# Patient Record
Sex: MALE | Race: WHITE | NOT HISPANIC OR LATINO | URBAN - METROPOLITAN AREA
[De-identification: names, ages, dates, MRNs, and addresses within clinical notes are randomized per-mention and may not be internally consistent; named-entity substitution may affect disease eponyms.]

---

## 2023-03-22 ENCOUNTER — TELEPHONE (OUTPATIENT)
Dept: PRIMARY CARE | Facility: CLINIC | Age: 53
End: 2023-03-22

## 2023-03-22 DIAGNOSIS — Z12.5 SCREENING FOR PROSTATE CANCER: ICD-10-CM

## 2023-03-22 DIAGNOSIS — E11.9 TYPE 2 DIABETES MELLITUS WITHOUT COMPLICATION, WITHOUT LONG-TERM CURRENT USE OF INSULIN (MULTI): Primary | ICD-10-CM

## 2023-03-22 NOTE — TELEPHONE ENCOUNTER
Pt states that he is supposed to have some blood work done coming up soon and I don't see any orders in the system. Can you please advise? Thanks!

## 2023-03-23 DIAGNOSIS — Z12.5 SCREENING FOR PROSTATE CANCER: ICD-10-CM

## 2023-03-23 DIAGNOSIS — E11.9 TYPE 2 DIABETES MELLITUS WITHOUT COMPLICATION, WITHOUT LONG-TERM CURRENT USE OF INSULIN (MULTI): Primary | ICD-10-CM

## 2023-04-06 DIAGNOSIS — B35.1 ONYCHOMYCOSIS OF TOENAIL: Primary | ICD-10-CM

## 2023-04-06 RX ORDER — KETOCONAZOLE 20 MG/G
CREAM TOPICAL 2 TIMES DAILY
COMMUNITY
End: 2023-04-06 | Stop reason: SDUPTHER

## 2023-04-06 RX ORDER — KETOCONAZOLE 20 MG/G
CREAM TOPICAL 2 TIMES DAILY
Qty: 15 G | Refills: 0 | Status: SHIPPED | OUTPATIENT
Start: 2023-04-06 | End: 2023-04-20

## 2023-04-14 ENCOUNTER — DOCUMENTATION (OUTPATIENT)
Dept: PRIMARY CARE | Facility: CLINIC | Age: 53
End: 2023-04-14
Payer: COMMERCIAL

## 2023-04-14 NOTE — TELEPHONE ENCOUNTER
Ke called and said that he is scheduled for a Colonoscopy in a week and a half from now.  He received a message from the provider that is doing the colonoscopy and asked him to verify with you if you need him to make any changes or adjustments to his medications before the procedure.  If we can let him know.  He is ok with us leaving a message on his voicemail if he doesn't answer.  Thank you

## 2023-04-14 NOTE — PROGRESS NOTES
The following phone response note was sent to FABIENNE BLOUNT) to be on 4/14/2023:     Usually it advised to stop Aspirin and Meloxicam 5-7 days before the procedure. THE PATIENT MUST CHECK WITH THE GASTROENTEROLOGIST WHAT THEIR EXACT PROTOCOL IS !!! RESUME ASPRIN and Meloxicam after the procedure per protocol of the performing facility.  THE PATIENT MUST CHECK WITH THE GASTROENTEROLOGIST WHAT THEIR EXACT PROTOCOL IS !!!     Advise the patient to stop Hydrochlorothiazide and blood sugar medications (Jardiance, Glipizide, and Metformin) the day before the procedure; he may resume Hydrochlorothiazide, and blood sugar medications (Jardiance, Glipizide, and Metformin) 3-4 hours after the colonoscopy. Take medications at the usual time of day. TY     Dr. Perez was consulted on the plan of care.

## 2023-04-24 ENCOUNTER — HOSPITAL ENCOUNTER (OUTPATIENT)
Dept: DATA CONVERSION | Facility: HOSPITAL | Age: 53
End: 2023-04-24
Attending: INTERNAL MEDICINE | Admitting: INTERNAL MEDICINE
Payer: COMMERCIAL

## 2023-04-24 DIAGNOSIS — E11.9 TYPE 2 DIABETES MELLITUS WITHOUT COMPLICATIONS (MULTI): ICD-10-CM

## 2023-04-24 DIAGNOSIS — I10 ESSENTIAL (PRIMARY) HYPERTENSION: ICD-10-CM

## 2023-04-24 DIAGNOSIS — E78.5 HYPERLIPIDEMIA, UNSPECIFIED: ICD-10-CM

## 2023-04-24 DIAGNOSIS — Z79.84 LONG TERM (CURRENT) USE OF ORAL HYPOGLYCEMIC DRUGS: ICD-10-CM

## 2023-04-24 DIAGNOSIS — Z79.82 LONG TERM (CURRENT) USE OF ASPIRIN: ICD-10-CM

## 2023-04-24 DIAGNOSIS — Z12.11 ENCOUNTER FOR SCREENING FOR MALIGNANT NEOPLASM OF COLON: ICD-10-CM

## 2023-04-24 LAB — POCT GLUCOSE: 248 MG/DL (ref 74–99)

## 2023-06-12 ENCOUNTER — OFFICE VISIT (OUTPATIENT)
Dept: PRIMARY CARE | Facility: CLINIC | Age: 53
End: 2023-06-12
Payer: COMMERCIAL

## 2023-06-12 VITALS
TEMPERATURE: 98.5 F | WEIGHT: 209.4 LBS | BODY MASS INDEX: 29.98 KG/M2 | HEART RATE: 86 BPM | RESPIRATION RATE: 18 BRPM | OXYGEN SATURATION: 97 % | SYSTOLIC BLOOD PRESSURE: 111 MMHG | DIASTOLIC BLOOD PRESSURE: 70 MMHG | HEIGHT: 70 IN

## 2023-06-12 DIAGNOSIS — E66.9 OBESITY (BMI 30-39.9): ICD-10-CM

## 2023-06-12 DIAGNOSIS — I10 ESSENTIAL (PRIMARY) HYPERTENSION: ICD-10-CM

## 2023-06-12 DIAGNOSIS — E11.9 TYPE 2 DIABETES MELLITUS WITHOUT COMPLICATIONS (MULTI): Primary | ICD-10-CM

## 2023-06-12 PROCEDURE — 3078F DIAST BP <80 MM HG: CPT | Performed by: NURSE PRACTITIONER

## 2023-06-12 PROCEDURE — 1036F TOBACCO NON-USER: CPT | Performed by: NURSE PRACTITIONER

## 2023-06-12 PROCEDURE — 3074F SYST BP LT 130 MM HG: CPT | Performed by: NURSE PRACTITIONER

## 2023-06-12 PROCEDURE — 99213 OFFICE O/P EST LOW 20 MIN: CPT | Performed by: NURSE PRACTITIONER

## 2023-06-12 PROCEDURE — 4010F ACE/ARB THERAPY RXD/TAKEN: CPT | Performed by: NURSE PRACTITIONER

## 2023-06-12 RX ORDER — MELOXICAM 7.5 MG/1
TABLET ORAL
COMMUNITY
Start: 2023-04-18 | End: 2024-05-03 | Stop reason: WASHOUT

## 2023-06-12 RX ORDER — BLOOD SUGAR DIAGNOSTIC
STRIP MISCELLANEOUS
COMMUNITY

## 2023-06-12 RX ORDER — ASPIRIN 81 MG/1
1 TABLET ORAL DAILY
COMMUNITY
Start: 2014-03-10 | End: 2024-05-03

## 2023-06-12 RX ORDER — METFORMIN HYDROCHLORIDE 1000 MG/1
1000 TABLET ORAL 2 TIMES DAILY
COMMUNITY
End: 2023-10-03

## 2023-06-12 RX ORDER — HYDROCHLOROTHIAZIDE 25 MG/1
25 TABLET ORAL DAILY
Qty: 90 TABLET | Refills: 1 | Status: SHIPPED | OUTPATIENT
Start: 2023-06-12 | End: 2024-01-22

## 2023-06-12 RX ORDER — EMPAGLIFLOZIN 10 MG/1
10 TABLET, FILM COATED ORAL
COMMUNITY
End: 2024-01-02

## 2023-06-12 RX ORDER — GLIPIZIDE 10 MG/1
10 TABLET, FILM COATED, EXTENDED RELEASE ORAL 2 TIMES DAILY
Qty: 180 TABLET | Refills: 1 | Status: SHIPPED | OUTPATIENT
Start: 2023-06-12 | End: 2024-02-20

## 2023-06-12 RX ORDER — LISINOPRIL 2.5 MG/1
2.5 TABLET ORAL DAILY
COMMUNITY
End: 2023-08-21

## 2023-06-12 RX ORDER — BLOOD-GLUCOSE CONTROL, NORMAL
EACH MISCELLANEOUS
COMMUNITY
Start: 2023-02-20 | End: 2024-01-03

## 2023-06-12 RX ORDER — OMEGA-3/DHA/EPA/FISH OIL 300-1000MG
1 CAPSULE,DELAYED RELEASE (ENTERIC COATED) ORAL 2 TIMES DAILY
COMMUNITY

## 2023-06-12 RX ORDER — ATORVASTATIN CALCIUM 10 MG/1
10 TABLET, FILM COATED ORAL EVERY OTHER DAY
Qty: 45 TABLET | Refills: 1 | Status: SHIPPED | OUTPATIENT
Start: 2023-06-12 | End: 2023-08-21

## 2023-06-12 RX ORDER — ATORVASTATIN CALCIUM 10 MG/1
10 TABLET, FILM COATED ORAL EVERY OTHER DAY
COMMUNITY
End: 2023-06-12 | Stop reason: SDUPTHER

## 2023-06-12 NOTE — PROGRESS NOTES
"Subjective   Patient ID: Ke Haney is a 52 y.o. male who presents for Follow-up (Blood work results).    HPI   Patient in office for follow-up on hypertension and diabetes and to discuss recent lab work. A1C: 8.2% (patient wants to address with diet adjustment; declines medication changes; risks explained; the patient verbalizes understanding). No symptoms or other concerns today.     BP: normal     Sees ophthalmologist regularly.      Monitors his feet for lesions.     Review of Systems   Constitutional:  Negative for activity change, appetite change, chills, diaphoresis, fatigue, fever and unexpected weight change.   Respiratory:  Negative for apnea, cough, choking, chest tightness, shortness of breath, wheezing and stridor.    Cardiovascular:  Negative for chest pain, palpitations and leg swelling.   Gastrointestinal:  Negative for abdominal pain, diarrhea, nausea and vomiting.   Neurological:  Negative for dizziness, tremors, seizures, syncope, facial asymmetry, speech difficulty, weakness, light-headedness, numbness and headaches.   Hematological:  Negative for adenopathy. Does not bruise/bleed easily.     Objective   /70   Pulse 86   Temp 36.9 °C (98.5 °F)   Resp 18   Ht 1.778 m (5' 10\")   Wt 95 kg (209 lb 6.4 oz)   SpO2 97%   BMI 30.05 kg/m²     Physical Exam  Constitutional:       Appearance: Normal appearance.   HENT:      Head: Normocephalic.   Eyes:      Conjunctiva/sclera: Conjunctivae normal.   Cardiovascular:      Rate and Rhythm: Normal rate and regular rhythm.      Pulses: Normal pulses.      Heart sounds: Normal heart sounds.   Pulmonary:      Effort: Pulmonary effort is normal.      Breath sounds: Normal breath sounds.   Musculoskeletal:      Right lower leg: No edema.      Left lower leg: No edema.   Skin:     General: Skin is warm.      Coloration: Skin is not jaundiced or pale.   Neurological:      General: No focal deficit present.      Mental Status: He is alert. "       Assessment/Plan     Exam findings reviewed with patient. Medications and labs reviewed. Patient will keep monitoring blood pressures and blood sugars at home; he will call the office with outliers or abnormal trends. Follow up in 6 months or earlier (if AM fasting glucose is persistently >150) as needed.    Benefits of healthy lifestyle reviewed: low carbohydrates/fat/sugar diet; use lean white instead of red meet; use several servings of fruit and vegetables daily; use whole grain flour instead of white flour products; cook at home frequently instead of eating out; exercise 30-90 minutes 5 x/week.

## 2023-08-07 DIAGNOSIS — B35.1 ONYCHOMYCOSIS OF TOENAIL: ICD-10-CM

## 2023-08-07 RX ORDER — KETOCONAZOLE 20 MG/G
CREAM TOPICAL
Qty: 30 G | Refills: 0 | Status: SHIPPED | OUTPATIENT
Start: 2023-08-07

## 2023-08-30 ENCOUNTER — TELEPHONE (OUTPATIENT)
Dept: PRIMARY CARE | Facility: CLINIC | Age: 53
End: 2023-08-30
Payer: COMMERCIAL

## 2023-09-07 VITALS
RESPIRATION RATE: 16 BRPM | TEMPERATURE: 96.8 F | HEART RATE: 100 BPM | DIASTOLIC BLOOD PRESSURE: 88 MMHG | SYSTOLIC BLOOD PRESSURE: 149 MMHG

## 2023-09-14 NOTE — H&P
History of Present Illness:   History Present Illness:  Reason for surgery: Screening   HPI:    51 yo man for screening colonoscopy    Allergies:        Allergies:  ·  No Known Allergies :     Home Medication Review:   Home Medications Reviewed: yes     Impression/Procedure:   ·  Impression and Planned Procedure: R/O neoplasm for colonoscopy       ERAS (Enhanced Recovery After Surgery):  ·  ERAS Patient: no       Vital Signs:  Temperature C: 36 degrees C   Temperature F: 96.8 degrees F   Heart Rate: 100 beats per minute   Respiratory Rate: 16 breath per minute   Blood Pressure Systolic: 149 mm/Hg   Blood Pressure Diastolic: 88 mm/Hg     Physical Exam by System:    Constitutional: Well developed, awake/alert/oriented  x3, no distress, alert and cooperative   Respiratory/Thorax: Patent airways, CTAB, normal  breath sounds with good chest expansion, thorax symmetric   Cardiovascular: Regular, rate and rhythm, no murmurs,  2+ equal pulses of the extremities, normal S 1and S 2     Consent:   COVID-19 Consent:  ·  COVID-19 Risk Consent Surgeon has reviewed key risks related to the risk of esme COVID-19 and if they contract COVID-19 what the risks are.       Electronic Signatures:  J Carlos Hernandez)  (Signed 24-Apr-2023 09:02)   Authored: History of Present Illness, Allergies, Home  Medication Review, Impression/Procedure, ERAS, Physical Exam, Consent, Note Completion      Last Updated: 24-Apr-2023 09:02 by J Carlos Hernandez)

## 2023-11-06 ENCOUNTER — TELEPHONE (OUTPATIENT)
Dept: PRIMARY CARE | Facility: CLINIC | Age: 53
End: 2023-11-06
Payer: COMMERCIAL

## 2023-11-06 DIAGNOSIS — Z12.5 SCREENING FOR PROSTATE CANCER: ICD-10-CM

## 2023-11-06 DIAGNOSIS — E11.9 TYPE 2 DIABETES MELLITUS WITHOUT COMPLICATION, WITHOUT LONG-TERM CURRENT USE OF INSULIN (MULTI): Primary | ICD-10-CM

## 2023-11-06 NOTE — TELEPHONE ENCOUNTER
I talked to Ke and he would like to have everything done EXCEPT the Hep B titer.  He said that he would like to do that at a different time.  Can you please place the orders?  He will come to pick them up when they are ready.

## 2023-11-06 NOTE — TELEPHONE ENCOUNTER
Patient notified that the orders are ready to be picked up.  He said that he will come by and get them.

## 2023-11-06 NOTE — TELEPHONE ENCOUNTER
I talked to Ke and asked if he was ok with all of those orders being placed.  He said that he wasn't at home, but he will give us a call back and let us know.

## 2023-11-06 NOTE — TELEPHONE ENCOUNTER
Ke called and asked if you could please place his lab orders.  He goes to [facility].  He will  the orders when they are ready.  Please place the lab orders.  Thank you

## 2023-11-16 ENCOUNTER — APPOINTMENT (OUTPATIENT)
Dept: PRIMARY CARE | Facility: CLINIC | Age: 53
End: 2023-11-16
Payer: COMMERCIAL

## 2023-12-11 ENCOUNTER — OFFICE VISIT (OUTPATIENT)
Dept: PRIMARY CARE | Facility: CLINIC | Age: 53
End: 2023-12-11
Payer: COMMERCIAL

## 2023-12-11 VITALS
DIASTOLIC BLOOD PRESSURE: 74 MMHG | BODY MASS INDEX: 30.05 KG/M2 | HEART RATE: 83 BPM | SYSTOLIC BLOOD PRESSURE: 124 MMHG | OXYGEN SATURATION: 97 % | TEMPERATURE: 98.7 F | WEIGHT: 209.4 LBS

## 2023-12-11 DIAGNOSIS — E11.9 TYPE 2 DIABETES MELLITUS WITHOUT COMPLICATION, WITHOUT LONG-TERM CURRENT USE OF INSULIN (MULTI): Primary | ICD-10-CM

## 2023-12-11 DIAGNOSIS — E66.9 OBESITY (BMI 30-39.9): ICD-10-CM

## 2023-12-11 DIAGNOSIS — I10 ESSENTIAL (PRIMARY) HYPERTENSION: ICD-10-CM

## 2023-12-11 PROCEDURE — 3074F SYST BP LT 130 MM HG: CPT | Performed by: NURSE PRACTITIONER

## 2023-12-11 PROCEDURE — 1036F TOBACCO NON-USER: CPT | Performed by: NURSE PRACTITIONER

## 2023-12-11 PROCEDURE — 4010F ACE/ARB THERAPY RXD/TAKEN: CPT | Performed by: NURSE PRACTITIONER

## 2023-12-11 PROCEDURE — 3078F DIAST BP <80 MM HG: CPT | Performed by: NURSE PRACTITIONER

## 2023-12-11 PROCEDURE — 99213 OFFICE O/P EST LOW 20 MIN: CPT | Performed by: NURSE PRACTITIONER

## 2023-12-11 NOTE — PROGRESS NOTES
Subjective   Patient ID: Ke Haney is a 53 y.o. male who presents for Follow-up (Patient is here for a 6 month follow-up.).    HPI   Patient in office for follow-up on hypertension and diabetes and to discuss recent lab work. A1C: 8.7% (patient wants to address with diet adjustment; declines medication changes; risks explained; the patient verbalizes understanding). No symptoms or other concerns today.     BP: normal     Sees ophthalmologist regularly.      Monitors his feet for lesions.    The patient declines recommended immunizations and referrals to recommended health screens at this time. Risks and benefits of immunizations and health screens reviewed. The patient verbalized understanding.     Review of Systems   Constitutional:  Negative for activity change, appetite change, chills, diaphoresis, fatigue, fever and unexpected weight change.   Respiratory:  Negative for apnea, cough, choking, chest tightness, shortness of breath, wheezing and stridor.    Cardiovascular:  Negative for chest pain, palpitations and leg swelling.   Gastrointestinal:  Negative for abdominal pain, diarrhea, nausea and vomiting.   Neurological:  Negative for dizziness, tremors, seizures, syncope, facial asymmetry, speech difficulty, weakness, light-headedness, numbness and headaches.   Hematological:  Negative for adenopathy. Does not bruise/bleed easily.     Objective   /74   Pulse 83   Temp 37.1 °C (98.7 °F) (Temporal)   Wt 95 kg (209 lb 6.4 oz)   SpO2 97%   BMI 30.05 kg/m²     Physical Exam  Constitutional:       Appearance: Normal appearance.   HENT:      Head: Normocephalic.   Eyes:      Conjunctiva/sclera: Conjunctivae normal.   Cardiovascular:      Rate and Rhythm: Normal rate and regular rhythm.      Pulses: Normal pulses.      Heart sounds: Normal heart sounds.   Pulmonary:      Effort: Pulmonary effort is normal.      Breath sounds: Normal breath sounds.   Musculoskeletal:      Right lower leg: No edema.       Left lower leg: No edema.   Skin:     General: Skin is warm.      Coloration: Skin is not jaundiced or pale.   Neurological:      General: No focal deficit present.      Mental Status: He is alert.       Assessment/Plan     Exam findings reviewed with patient. Medications and labs reviewed. Patient will keep monitoring blood pressures and blood sugars at home; he will call the office with outliers or abnormal trends. Follow up in 3 months or earlier (if AM fasting glucose is persistently >150) as needed.    Benefits of healthy lifestyle reviewed: low carbohydrates/fat/sugar diet; use lean white instead of red meet; use several servings of fruit and vegetables daily; use whole grain flour instead of white flour products; cook at home frequently instead of eating out; exercise 30-90 minutes 5 x/week.

## 2023-12-31 DIAGNOSIS — E11.9 TYPE 2 DIABETES MELLITUS WITHOUT COMPLICATIONS (MULTI): ICD-10-CM

## 2024-01-02 DIAGNOSIS — E11.9 TYPE 2 DIABETES MELLITUS WITHOUT COMPLICATIONS (MULTI): ICD-10-CM

## 2024-01-02 RX ORDER — METFORMIN HYDROCHLORIDE 1000 MG/1
1000 TABLET ORAL 2 TIMES DAILY
Qty: 180 TABLET | Refills: 0 | Status: SHIPPED | OUTPATIENT
Start: 2024-01-02 | End: 2024-04-04

## 2024-01-02 RX ORDER — EMPAGLIFLOZIN 10 MG/1
10 TABLET, FILM COATED ORAL
Qty: 90 TABLET | Refills: 2 | Status: SHIPPED | OUTPATIENT
Start: 2024-01-02 | End: 2024-02-13 | Stop reason: DRUGHIGH

## 2024-01-03 DIAGNOSIS — E11.9 TYPE 2 DIABETES MELLITUS WITHOUT COMPLICATIONS (MULTI): ICD-10-CM

## 2024-01-03 RX ORDER — BLOOD-GLUCOSE CONTROL, NORMAL
EACH MISCELLANEOUS
Qty: 50 EACH | Refills: 2 | Status: SHIPPED | OUTPATIENT
Start: 2024-01-03 | End: 2024-01-03

## 2024-01-03 RX ORDER — BLOOD-GLUCOSE CONTROL, NORMAL
EACH MISCELLANEOUS
Qty: 50 EACH | Refills: 2 | Status: SHIPPED | OUTPATIENT
Start: 2024-01-03

## 2024-02-12 ENCOUNTER — TELEPHONE (OUTPATIENT)
Dept: PRIMARY CARE | Facility: CLINIC | Age: 54
End: 2024-02-12
Payer: COMMERCIAL

## 2024-02-13 DIAGNOSIS — E11.9 TYPE 2 DIABETES MELLITUS WITHOUT COMPLICATION, WITHOUT LONG-TERM CURRENT USE OF INSULIN (MULTI): Primary | ICD-10-CM

## 2024-03-25 ENCOUNTER — OFFICE VISIT (OUTPATIENT)
Dept: PRIMARY CARE | Facility: CLINIC | Age: 54
End: 2024-03-25
Payer: COMMERCIAL

## 2024-03-25 VITALS
HEIGHT: 70 IN | DIASTOLIC BLOOD PRESSURE: 72 MMHG | OXYGEN SATURATION: 97 % | RESPIRATION RATE: 16 BRPM | WEIGHT: 205.6 LBS | HEART RATE: 86 BPM | SYSTOLIC BLOOD PRESSURE: 115 MMHG | TEMPERATURE: 98.3 F | BODY MASS INDEX: 29.43 KG/M2

## 2024-03-25 DIAGNOSIS — E11.9 TYPE 2 DIABETES MELLITUS WITHOUT COMPLICATION, WITHOUT LONG-TERM CURRENT USE OF INSULIN (MULTI): Primary | ICD-10-CM

## 2024-03-25 DIAGNOSIS — E11.9 TYPE 2 DIABETES MELLITUS WITHOUT COMPLICATIONS (MULTI): ICD-10-CM

## 2024-03-25 DIAGNOSIS — I10 ESSENTIAL (PRIMARY) HYPERTENSION: ICD-10-CM

## 2024-03-25 DIAGNOSIS — E66.3 OVERWEIGHT (BMI 25.0-29.9): ICD-10-CM

## 2024-03-25 PROCEDURE — 1036F TOBACCO NON-USER: CPT | Performed by: NURSE PRACTITIONER

## 2024-03-25 PROCEDURE — 4010F ACE/ARB THERAPY RXD/TAKEN: CPT | Performed by: NURSE PRACTITIONER

## 2024-03-25 PROCEDURE — 99213 OFFICE O/P EST LOW 20 MIN: CPT | Performed by: NURSE PRACTITIONER

## 2024-03-25 PROCEDURE — 3074F SYST BP LT 130 MM HG: CPT | Performed by: NURSE PRACTITIONER

## 2024-03-25 PROCEDURE — 3078F DIAST BP <80 MM HG: CPT | Performed by: NURSE PRACTITIONER

## 2024-03-25 RX ORDER — HYDROCHLOROTHIAZIDE 25 MG/1
25 TABLET ORAL DAILY
Qty: 90 TABLET | Refills: 1 | Status: SHIPPED | OUTPATIENT
Start: 2024-03-25

## 2024-03-25 RX ORDER — LISINOPRIL 2.5 MG/1
2.5 TABLET ORAL DAILY
Qty: 90 TABLET | Refills: 1 | Status: SHIPPED | OUTPATIENT
Start: 2024-03-25

## 2024-03-25 RX ORDER — ATORVASTATIN CALCIUM 10 MG/1
10 TABLET, FILM COATED ORAL EVERY OTHER DAY
Qty: 45 TABLET | Refills: 1 | Status: SHIPPED | OUTPATIENT
Start: 2024-03-25

## 2024-03-25 RX ORDER — GLIPIZIDE 10 MG/1
10 TABLET, FILM COATED, EXTENDED RELEASE ORAL 2 TIMES DAILY
Qty: 180 TABLET | Refills: 1 | Status: SHIPPED | OUTPATIENT
Start: 2024-03-25 | End: 2025-03-25

## 2024-03-25 NOTE — PROGRESS NOTES
"Subjective   Patient ID: Ke Haney is a 53 y.o. male who presents for Diabetes.    HPI   Patient in office for follow-up on hypertension and diabetes and to discuss recent lab work. A1C: 8.7%. Patient states that his AM fasting levels have been improving on the increased Jardiance dose are now persistently <150.  No symptoms or other concerns today.     BP: normal     Sees ophthalmologist regularly.      Monitors his feet for lesions.    The patient declines recommended immunizations and referrals to recommended health screens at this time. Risks and benefits of immunizations and health screens reviewed. The patient verbalized understanding.     Review of Systems   Constitutional:  Negative for activity change, appetite change, chills, diaphoresis, fatigue, fever and unexpected weight change.   Respiratory:  Negative for apnea, cough, choking, chest tightness, shortness of breath, wheezing and stridor.    Cardiovascular:  Negative for chest pain, palpitations and leg swelling.   Gastrointestinal:  Negative for abdominal pain, diarrhea, nausea and vomiting.   Neurological:  Negative for dizziness, tremors, seizures, syncope, facial asymmetry, speech difficulty, weakness, light-headedness, numbness and headaches.   Hematological:  Negative for adenopathy. Does not bruise/bleed easily.     Objective   /72   Pulse 86   Temp 36.8 °C (98.3 °F) (Temporal)   Resp 16   Ht 1.778 m (5' 10\")   Wt 93.3 kg (205 lb 9.6 oz)   SpO2 97%   BMI 29.50 kg/m²     Physical Exam  Constitutional:       Appearance: Normal appearance.   HENT:      Head: Normocephalic.   Eyes:      Conjunctiva/sclera: Conjunctivae normal.   Cardiovascular:      Rate and Rhythm: Normal rate and regular rhythm.      Pulses: Normal pulses.      Heart sounds: Normal heart sounds.   Pulmonary:      Effort: Pulmonary effort is normal.      Breath sounds: Normal breath sounds.   Musculoskeletal:      Right lower leg: No edema.      Left lower leg: No " edema.   Skin:     General: Skin is warm.      Coloration: Skin is not jaundiced or pale.   Neurological:      General: No focal deficit present.      Mental Status: He is alert.       Assessment/Plan     Exam findings reviewed with patient. Medications and labs reviewed. Patient will keep monitoring blood pressures and blood sugars at home; he will call the office with outliers or abnormal trends. Follow up in 3 months or earlier (if AM fasting glucose is persistently >150) as needed.    Benefits of healthy lifestyle reviewed: low carbohydrates/fat/sugar diet; use lean white instead of red meet; use several servings of fruit and vegetables daily; use whole grain flour instead of white flour products; cook at home frequently instead of eating out; exercise 30-90 minutes 5 x/week.

## 2024-04-16 ENCOUNTER — TELEPHONE (OUTPATIENT)
Dept: PRIMARY CARE | Facility: CLINIC | Age: 54
End: 2024-04-16
Payer: COMMERCIAL

## 2024-04-16 DIAGNOSIS — E11.9 TYPE 2 DIABETES MELLITUS WITHOUT COMPLICATION, WITHOUT LONG-TERM CURRENT USE OF INSULIN (MULTI): ICD-10-CM

## 2024-04-17 DIAGNOSIS — E11.9 TYPE 2 DIABETES MELLITUS WITHOUT COMPLICATION, WITHOUT LONG-TERM CURRENT USE OF INSULIN (MULTI): ICD-10-CM

## 2024-05-03 ENCOUNTER — OFFICE VISIT (OUTPATIENT)
Dept: PRIMARY CARE | Facility: CLINIC | Age: 54
End: 2024-05-03
Payer: COMMERCIAL

## 2024-05-03 VITALS
RESPIRATION RATE: 16 BRPM | HEIGHT: 70 IN | HEART RATE: 90 BPM | WEIGHT: 192.6 LBS | BODY MASS INDEX: 27.57 KG/M2 | SYSTOLIC BLOOD PRESSURE: 129 MMHG | OXYGEN SATURATION: 97 % | TEMPERATURE: 97.7 F | DIASTOLIC BLOOD PRESSURE: 76 MMHG

## 2024-05-03 DIAGNOSIS — M62.838 MUSCLE SPASM: ICD-10-CM

## 2024-05-03 DIAGNOSIS — M54.31 SCIATICA OF RIGHT SIDE: Primary | ICD-10-CM

## 2024-05-03 DIAGNOSIS — R26.89 ANTALGIC GAIT: ICD-10-CM

## 2024-05-03 PROCEDURE — 4010F ACE/ARB THERAPY RXD/TAKEN: CPT | Performed by: NURSE PRACTITIONER

## 2024-05-03 PROCEDURE — 3074F SYST BP LT 130 MM HG: CPT | Performed by: NURSE PRACTITIONER

## 2024-05-03 PROCEDURE — 1036F TOBACCO NON-USER: CPT | Performed by: NURSE PRACTITIONER

## 2024-05-03 PROCEDURE — 99213 OFFICE O/P EST LOW 20 MIN: CPT | Performed by: NURSE PRACTITIONER

## 2024-05-03 PROCEDURE — 3078F DIAST BP <80 MM HG: CPT | Performed by: NURSE PRACTITIONER

## 2024-05-03 RX ORDER — NAPROXEN 500 MG/1
500 TABLET ORAL 2 TIMES DAILY PRN
Qty: 30 TABLET | Refills: 0 | Status: SHIPPED | OUTPATIENT
Start: 2024-05-03 | End: 2024-05-18

## 2024-05-03 RX ORDER — METHOCARBAMOL 500 MG/1
500 TABLET, FILM COATED ORAL 3 TIMES DAILY PRN
Qty: 30 TABLET | Refills: 0 | Status: SHIPPED | OUTPATIENT
Start: 2024-05-03 | End: 2024-05-13

## 2024-05-03 ASSESSMENT — ENCOUNTER SYMPTOMS
MYALGIAS: 0
UNEXPECTED WEIGHT CHANGE: 0
CHEST TIGHTNESS: 0
WEAKNESS: 0
WHEEZING: 0
NECK PAIN: 0
DIZZINESS: 0
NECK STIFFNESS: 0
COUGH: 0
PALPITATIONS: 0
JOINT SWELLING: 0
DIAPHORESIS: 0
FEVER: 0
HEADACHES: 0
COLOR CHANGE: 0
WOUND: 0
FATIGUE: 0
LIGHT-HEADEDNESS: 0
DIARRHEA: 0
NAUSEA: 0
ARTHRALGIAS: 1
STRIDOR: 0
BACK PAIN: 1
CHOKING: 0
ABDOMINAL PAIN: 0
CHILLS: 0
BRUISES/BLEEDS EASILY: 0
VOMITING: 0
ADENOPATHY: 0
APPETITE CHANGE: 0
ACTIVITY CHANGE: 0
SHORTNESS OF BREATH: 0
APNEA: 0

## 2024-05-03 NOTE — PROGRESS NOTES
"Subjective   Patient ID: Ke Haney is a 53 y.o. male who presents for Sciatica (Started 2 days ago).    HPI   Patient in office for back pain x 2 days. Injured his back changing positions. Pain is mostly on the R side, radiating into R leg. Ambulatory with cane, with stable and steady gait. No loss of bowel or bladder control. No loss of dependent sensation. Hx of sciatica; has responded well to Naproxen and muscle relaxers in the past. No other concerns today.    Review of Systems   Constitutional:  Negative for activity change, appetite change, chills, diaphoresis, fatigue, fever and unexpected weight change.   Respiratory:  Negative for apnea, cough, choking, chest tightness, shortness of breath, wheezing and stridor.    Cardiovascular:  Negative for chest pain, palpitations and leg swelling.   Gastrointestinal:  Negative for abdominal pain, diarrhea, nausea and vomiting.   Musculoskeletal:  Positive for arthralgias, back pain and gait problem. Negative for joint swelling, myalgias, neck pain and neck stiffness.   Skin:  Negative for color change, pallor, rash and wound.   Neurological:  Negative for dizziness, syncope, weakness, light-headedness and headaches.   Hematological:  Negative for adenopathy. Does not bruise/bleed easily.     Objective   /76   Pulse 90   Temp 36.5 °C (97.7 °F) (Temporal)   Resp 16   Ht 1.778 m (5' 10\")   Wt 87.4 kg (192 lb 9.6 oz)   SpO2 97%   BMI 27.64 kg/m²     Physical Exam  Constitutional:       Appearance: Normal appearance.   Cardiovascular:      Rate and Rhythm: Normal rate and regular rhythm.      Pulses: Normal pulses.      Heart sounds: Normal heart sounds.   Pulmonary:      Effort: Pulmonary effort is normal.      Breath sounds: Normal breath sounds.   Musculoskeletal:         General: Tenderness present.      Right lower leg: No edema.      Left lower leg: No edema.      Comments: Tender in Right paraspinal lumbar area; Lumbar spinal ROM decreased   Skin:    "  General: Skin is warm.      Coloration: Skin is not jaundiced or pale.   Neurological:      General: No focal deficit present.      Mental Status: He is alert.      Comments: Slightly antalgic gait on cane; stable and steady        Assessment/Plan     Exam findings reviewed with patient. Medications discussed. Patient declines Toradol injection (hx of vasovagal response). Supportive measures discussed. May use hot-cold therapy. Activity as tolerated. Advised the patient to avoid driving or operating heavy machinery while on muscle relaxer. Consider PT and ortho referrals. Follow up in 1 week or earlier if no improvement.

## 2024-05-03 NOTE — LETTER
5/3/2024    To whom it may concern:    Mr. Ke Haney is currently under my medical care. Please excuse him from work. Tentative return to work is 5/7/2024.    Sincerely,    ADELA Barrios

## 2024-06-13 ENCOUNTER — OFFICE VISIT (OUTPATIENT)
Dept: PRIMARY CARE | Facility: CLINIC | Age: 54
End: 2024-06-13
Payer: COMMERCIAL

## 2024-06-13 VITALS
WEIGHT: 211 LBS | TEMPERATURE: 97.4 F | SYSTOLIC BLOOD PRESSURE: 136 MMHG | RESPIRATION RATE: 16 BRPM | OXYGEN SATURATION: 98 % | HEART RATE: 86 BPM | BODY MASS INDEX: 30.21 KG/M2 | HEIGHT: 70 IN | DIASTOLIC BLOOD PRESSURE: 78 MMHG

## 2024-06-13 DIAGNOSIS — R40.0 DAYTIME SOMNOLENCE: Primary | ICD-10-CM

## 2024-06-13 PROCEDURE — 4010F ACE/ARB THERAPY RXD/TAKEN: CPT | Performed by: NURSE PRACTITIONER

## 2024-06-13 PROCEDURE — 99212 OFFICE O/P EST SF 10 MIN: CPT | Performed by: NURSE PRACTITIONER

## 2024-06-13 PROCEDURE — 3075F SYST BP GE 130 - 139MM HG: CPT | Performed by: NURSE PRACTITIONER

## 2024-06-13 PROCEDURE — 1036F TOBACCO NON-USER: CPT | Performed by: NURSE PRACTITIONER

## 2024-06-13 PROCEDURE — 3078F DIAST BP <80 MM HG: CPT | Performed by: NURSE PRACTITIONER

## 2024-06-13 ASSESSMENT — ENCOUNTER SYMPTOMS
APPETITE CHANGE: 0
FATIGUE: 0
CONFUSION: 0
DECREASED CONCENTRATION: 0
DIAPHORESIS: 0
HYPERACTIVE: 0
TREMORS: 0
AGITATION: 0
WHEEZING: 0
PALPITATIONS: 0
STRIDOR: 0
FEVER: 0
CHILLS: 0
APNEA: 0
CHOKING: 0
NERVOUS/ANXIOUS: 0
UNEXPECTED WEIGHT CHANGE: 0
DIZZINESS: 0
DYSPHORIC MOOD: 0
COUGH: 0
CHEST TIGHTNESS: 0
WEAKNESS: 0
LIGHT-HEADEDNESS: 0
SHORTNESS OF BREATH: 0
HEADACHES: 0
HALLUCINATIONS: 0
SEIZURES: 0
ACTIVITY CHANGE: 0

## 2024-06-13 NOTE — PROGRESS NOTES
"Subjective   Patient ID: Ke Haney is a 54 y.o. male who presents for daytime somnolence.    HPI  Patient in office for a concern of daytime somnolence. The patient states that his work schedule might change in the near future and involve shift work past 4 AM. In the past (over 10 years ago), during a similar shift change, the patient  experienced extreme daytime somnolence and received a medical exemption by Dr. Perez from any work between 4 AM and noon. He has no other concerns today. No sleeping problems currently. The patient wants to be proactive and possibly avoid having to work shifts between 4 AM and noon. No signs of daytime sleepiness currently.     FLAVIA-7: 0/21; PHQ-9: 0/27    Review of Systems   Constitutional:  Negative for activity change, appetite change, chills, diaphoresis, fatigue, fever and unexpected weight change.   Respiratory:  Negative for apnea, cough, choking, chest tightness, shortness of breath, wheezing and stridor.    Cardiovascular:  Negative for chest pain, palpitations and leg swelling.   Neurological:  Negative for dizziness, tremors, seizures, syncope, weakness, light-headedness and headaches.   Psychiatric/Behavioral:  Negative for agitation, behavioral problems, confusion, decreased concentration, dysphoric mood, hallucinations, self-injury and suicidal ideas. The patient is not nervous/anxious and is not hyperactive.      Objective   /78   Pulse 86   Temp 36.3 °C (97.4 °F) (Temporal)   Resp 16   Ht 1.778 m (5' 10\")   Wt 95.7 kg (211 lb)   SpO2 98%   BMI 30.28 kg/m²     Physical Exam  Constitutional:       Appearance: Normal appearance.   Cardiovascular:      Rate and Rhythm: Normal rate.   Pulmonary:      Effort: Pulmonary effort is normal.   Neurological:      General: No focal deficit present.      Mental Status: He is alert and oriented to person, place, and time.      Gait: Gait normal.   Psychiatric:         Mood and Affect: Mood normal.         Behavior: " Behavior normal.         Thought Content: Thought content normal.         Judgment: Judgment normal.       Assessment/Plan     Medical hx reviewed with patient. The patient will consult with his supervisor and with HR and contact our office if he needs a renewal of his medical exemption.

## 2024-06-24 ENCOUNTER — OFFICE VISIT (OUTPATIENT)
Dept: PRIMARY CARE | Facility: CLINIC | Age: 54
End: 2024-06-24
Payer: COMMERCIAL

## 2024-06-24 VITALS
SYSTOLIC BLOOD PRESSURE: 135 MMHG | DIASTOLIC BLOOD PRESSURE: 77 MMHG | OXYGEN SATURATION: 98 % | BODY MASS INDEX: 30.81 KG/M2 | WEIGHT: 215.2 LBS | HEART RATE: 82 BPM | HEIGHT: 70 IN | TEMPERATURE: 97.9 F | RESPIRATION RATE: 16 BRPM

## 2024-06-24 DIAGNOSIS — R40.0 DAYTIME SOMNOLENCE: Primary | ICD-10-CM

## 2024-06-24 PROCEDURE — 3078F DIAST BP <80 MM HG: CPT | Performed by: NURSE PRACTITIONER

## 2024-06-24 PROCEDURE — 3075F SYST BP GE 130 - 139MM HG: CPT | Performed by: NURSE PRACTITIONER

## 2024-06-24 PROCEDURE — 4010F ACE/ARB THERAPY RXD/TAKEN: CPT | Performed by: NURSE PRACTITIONER

## 2024-06-24 PROCEDURE — 1036F TOBACCO NON-USER: CPT | Performed by: NURSE PRACTITIONER

## 2024-06-24 PROCEDURE — 99212 OFFICE O/P EST SF 10 MIN: CPT | Performed by: NURSE PRACTITIONER

## 2024-06-24 ASSESSMENT — ENCOUNTER SYMPTOMS
HEADACHES: 0
FEVER: 0
APPETITE CHANGE: 0
SHORTNESS OF BREATH: 0
CHILLS: 0
CHEST TIGHTNESS: 0
DYSPHORIC MOOD: 0
UNEXPECTED WEIGHT CHANGE: 0
TREMORS: 0
WHEEZING: 0
FATIGUE: 0
HALLUCINATIONS: 0
NERVOUS/ANXIOUS: 0
ACTIVITY CHANGE: 0
WEAKNESS: 0
STRIDOR: 0
DIAPHORESIS: 0
SEIZURES: 0
PALPITATIONS: 0
CHOKING: 0
DIZZINESS: 0
AGITATION: 0
LIGHT-HEADEDNESS: 0
HYPERACTIVE: 0
DECREASED CONCENTRATION: 0
APNEA: 0
COUGH: 0
CONFUSION: 0

## 2024-06-24 NOTE — PROGRESS NOTES
"Subjective   Patient ID: Ke Haney is a 54 y.o. male who presents for daytime somnolence.    HPI  Patient in office for a follow-up/ update on hx of daytime somnolence. The patient states that his work schedule has not changed since his last office visit and that his current schedule has not caused him any sleep-related problems. The patient drives heavy equipment/operates heavy machinery. He was asked by his  advisers at work to follow-up with his PCP for another documented visit, in case his work schedule might change and trigger new episodes of daytime somnolence.  He has no symptoms currently. He has no other concerns today.     Review of Systems   Constitutional:  Negative for activity change, appetite change, chills, diaphoresis, fatigue, fever and unexpected weight change.   Respiratory:  Negative for apnea, cough, choking, chest tightness, shortness of breath, wheezing and stridor.    Cardiovascular:  Negative for chest pain, palpitations and leg swelling.   Neurological:  Negative for dizziness, tremors, seizures, syncope, weakness, light-headedness and headaches.   Psychiatric/Behavioral:  Negative for agitation, behavioral problems, confusion, decreased concentration, dysphoric mood, hallucinations, self-injury and suicidal ideas. The patient is not nervous/anxious and is not hyperactive.      Objective   /77   Pulse 82   Temp 36.6 °C (97.9 °F) (Temporal)   Resp 16   Ht 1.778 m (5' 10\")   Wt 97.6 kg (215 lb 3.2 oz)   SpO2 98%   BMI 30.88 kg/m²     Physical Exam  Constitutional:       Appearance: Normal appearance.   Cardiovascular:      Rate and Rhythm: Normal rate.   Pulmonary:      Effort: Pulmonary effort is normal.   Neurological:      General: No focal deficit present.      Mental Status: He is alert and oriented to person, place, and time.      Gait: Gait normal.   Psychiatric:         Mood and Affect: Mood normal.         Behavior: Behavior normal.         Thought Content: " Thought content normal.         Judgment: Judgment normal.       Assessment/Plan     Medical hx reviewed with patient. The patient will consult with his supervisor and with HR and contact our office if he needs a renewal of his medical exemption.

## 2024-06-26 ENCOUNTER — TELEPHONE (OUTPATIENT)
Dept: PRIMARY CARE | Facility: CLINIC | Age: 54
End: 2024-06-26
Payer: COMMERCIAL

## 2024-06-26 DIAGNOSIS — I10 ESSENTIAL (PRIMARY) HYPERTENSION: ICD-10-CM

## 2024-06-26 RX ORDER — HYDROCHLOROTHIAZIDE 25 MG/1
25 TABLET ORAL DAILY
Qty: 90 TABLET | Refills: 0 | Status: SHIPPED | OUTPATIENT
Start: 2024-06-26

## 2024-06-26 NOTE — TELEPHONE ENCOUNTER
Approving, but needs appt for additional refills.   
Patient requesting refill for hydrochlorothiazide 25 mg., [facility], [address].  
chewing/swallowing difficulty

## 2024-07-16 LAB — HEMOGLOBIN A1C/HEMOGLOBIN TOTAL IN BLOOD EXTERNAL: 9.1 %

## 2024-07-22 ENCOUNTER — APPOINTMENT (OUTPATIENT)
Dept: PRIMARY CARE | Facility: CLINIC | Age: 54
End: 2024-07-22
Payer: COMMERCIAL

## 2024-07-22 VITALS
OXYGEN SATURATION: 95 % | WEIGHT: 206.8 LBS | RESPIRATION RATE: 16 BRPM | DIASTOLIC BLOOD PRESSURE: 83 MMHG | BODY MASS INDEX: 29.67 KG/M2 | TEMPERATURE: 97.5 F | HEART RATE: 79 BPM | SYSTOLIC BLOOD PRESSURE: 131 MMHG

## 2024-07-22 DIAGNOSIS — I10 ESSENTIAL (PRIMARY) HYPERTENSION: ICD-10-CM

## 2024-07-22 DIAGNOSIS — E87.6 HYPOKALEMIA: ICD-10-CM

## 2024-07-22 DIAGNOSIS — E11.9 TYPE 2 DIABETES MELLITUS WITHOUT COMPLICATION, WITHOUT LONG-TERM CURRENT USE OF INSULIN (MULTI): Primary | ICD-10-CM

## 2024-07-22 DIAGNOSIS — R40.0 DAYTIME SOMNOLENCE: ICD-10-CM

## 2024-07-22 PROCEDURE — 3075F SYST BP GE 130 - 139MM HG: CPT | Performed by: NURSE PRACTITIONER

## 2024-07-22 PROCEDURE — 4010F ACE/ARB THERAPY RXD/TAKEN: CPT | Performed by: NURSE PRACTITIONER

## 2024-07-22 PROCEDURE — 3079F DIAST BP 80-89 MM HG: CPT | Performed by: NURSE PRACTITIONER

## 2024-07-22 PROCEDURE — 3046F HEMOGLOBIN A1C LEVEL >9.0%: CPT | Performed by: NURSE PRACTITIONER

## 2024-07-22 PROCEDURE — 1036F TOBACCO NON-USER: CPT | Performed by: NURSE PRACTITIONER

## 2024-07-22 PROCEDURE — 99213 OFFICE O/P EST LOW 20 MIN: CPT | Performed by: NURSE PRACTITIONER

## 2024-07-22 NOTE — PROGRESS NOTES
Subjective   Patient ID: Ke Haney is a 54 y.o. male who presents for Diabetes and Follow-up (Lab results).    Diabetes       Patient in office for follow-up on hypertension and diabetes and to discuss recent lab work. A1C: 9.1%. Patient states that his AM fasting levels have been 170s-220s. The patient is willing to start a weekly injectable; he will check with his insurance which medications are covered and call our office with an update. Potassium is slightly low; recheck in 1 week. Work schedule has not changed; no symptoms of daytime somnolence.     No symptoms or other concerns today.     BP: normal     Sees ophthalmologist regularly.      Monitors his feet for lesions.    The patient declines recommended immunizations and referrals to recommended health screens at this time. Risks and benefits of immunizations and health screens reviewed. The patient verbalized understanding.     Review of Systems   Constitutional:  Negative for activity change, appetite change, chills, diaphoresis, fatigue, fever and unexpected weight change.   Respiratory:  Negative for apnea, cough, choking, chest tightness, shortness of breath, wheezing and stridor.    Cardiovascular:  Negative for chest pain, palpitations and leg swelling.   Gastrointestinal:  Negative for abdominal pain, diarrhea, nausea and vomiting.   Neurological:  Negative for dizziness, tremors, seizures, syncope, facial asymmetry, speech difficulty, weakness, light-headedness, numbness and headaches.   Hematological:  Negative for adenopathy. Does not bruise/bleed easily.     Objective   /83   Pulse 79   Temp 36.4 °C (97.5 °F) (Temporal)   Resp 16   Wt 93.8 kg (206 lb 12.8 oz)   SpO2 95%   BMI 29.67 kg/m²     Physical Exam  Constitutional:       Appearance: Normal appearance.   HENT:      Head: Normocephalic.   Eyes:      Conjunctiva/sclera: Conjunctivae normal.   Cardiovascular:      Rate and Rhythm: Normal rate.   Pulmonary:      Effort: Pulmonary  effort is normal.   Skin:     General: Skin is warm.      Coloration: Skin is not jaundiced or pale.   Neurological:      General: No focal deficit present.      Mental Status: He is alert.       Assessment/Plan     Exam findings reviewed with patient. Medications and labs reviewed. Patient will keep monitoring blood pressures and blood sugars at home; he will call the office with outliers or abnormal trends. We will call with lab results and update the plan of care Follow up in 3 months as needed.    Benefits of healthy lifestyle reviewed: low carbohydrates/fat/sugar diet; use lean white instead of red meet; use several servings of fruit and vegetables daily; use whole grain flour instead of white flour products; cook at home frequently instead of eating out; exercise 30-90 minutes 5 x/week.

## 2024-10-12 DIAGNOSIS — E11.9 TYPE 2 DIABETES MELLITUS WITHOUT COMPLICATIONS (MULTI): ICD-10-CM

## 2024-10-14 RX ORDER — BLOOD-GLUCOSE CONTROL, NORMAL
EACH MISCELLANEOUS
Qty: 50 EACH | Refills: 2 | Status: SHIPPED | OUTPATIENT
Start: 2024-10-14

## 2024-10-30 DIAGNOSIS — E11.9 TYPE 2 DIABETES MELLITUS WITHOUT COMPLICATION, WITHOUT LONG-TERM CURRENT USE OF INSULIN (MULTI): ICD-10-CM

## 2024-11-11 ENCOUNTER — APPOINTMENT (OUTPATIENT)
Dept: PRIMARY CARE | Facility: CLINIC | Age: 54
End: 2024-11-11
Payer: COMMERCIAL

## 2024-11-11 VITALS
HEIGHT: 70 IN | WEIGHT: 202 LBS | TEMPERATURE: 98.1 F | HEART RATE: 75 BPM | BODY MASS INDEX: 28.92 KG/M2 | OXYGEN SATURATION: 97 % | SYSTOLIC BLOOD PRESSURE: 117 MMHG | DIASTOLIC BLOOD PRESSURE: 77 MMHG

## 2024-11-11 DIAGNOSIS — E87.6 HYPOKALEMIA: ICD-10-CM

## 2024-11-11 DIAGNOSIS — E11.9 TYPE 2 DIABETES MELLITUS WITHOUT COMPLICATION, WITHOUT LONG-TERM CURRENT USE OF INSULIN (MULTI): Primary | ICD-10-CM

## 2024-11-11 DIAGNOSIS — R73.09 HEMOGLOBIN A1C GREATER THAN 9%, INDICATING POOR DIABETIC CONTROL: ICD-10-CM

## 2024-11-11 DIAGNOSIS — E66.3 OVERWEIGHT (BMI 25.0-29.9): ICD-10-CM

## 2024-11-11 PROCEDURE — 4010F ACE/ARB THERAPY RXD/TAKEN: CPT | Performed by: NURSE PRACTITIONER

## 2024-11-11 PROCEDURE — 3046F HEMOGLOBIN A1C LEVEL >9.0%: CPT | Performed by: NURSE PRACTITIONER

## 2024-11-11 PROCEDURE — 99214 OFFICE O/P EST MOD 30 MIN: CPT | Performed by: NURSE PRACTITIONER

## 2024-11-11 PROCEDURE — 3008F BODY MASS INDEX DOCD: CPT | Performed by: NURSE PRACTITIONER

## 2024-11-11 PROCEDURE — 3074F SYST BP LT 130 MM HG: CPT | Performed by: NURSE PRACTITIONER

## 2024-11-11 PROCEDURE — 3078F DIAST BP <80 MM HG: CPT | Performed by: NURSE PRACTITIONER

## 2024-11-11 ASSESSMENT — COLUMBIA-SUICIDE SEVERITY RATING SCALE - C-SSRS
6. HAVE YOU EVER DONE ANYTHING, STARTED TO DO ANYTHING, OR PREPARED TO DO ANYTHING TO END YOUR LIFE?: NO
1. IN THE PAST MONTH, HAVE YOU WISHED YOU WERE DEAD OR WISHED YOU COULD GO TO SLEEP AND NOT WAKE UP?: NO
2. HAVE YOU ACTUALLY HAD ANY THOUGHTS OF KILLING YOURSELF?: NO

## 2024-11-11 ASSESSMENT — PATIENT HEALTH QUESTIONNAIRE - PHQ9
1. LITTLE INTEREST OR PLEASURE IN DOING THINGS: NOT AT ALL
SUM OF ALL RESPONSES TO PHQ9 QUESTIONS 1 AND 2: 0
2. FEELING DOWN, DEPRESSED OR HOPELESS: NOT AT ALL

## 2024-11-11 NOTE — PROGRESS NOTES
"Subjective   Patient ID: Ke Haney is a 54 y.o. male who presents for Follow-up (Pt is here for F/U ).    HPI  Patient in office for follow-up on hypertension and diabetes and to discuss recent lab work. A1C: 9.2%; remains above goal. AM fasting levels at home have been 170s-220s. The patient agrees to a referral to clinical pharmacology for an evaluation and medication adjustment; will refer to Dr. Vasquez and notify her about the referral. Hypokalemia has resolved. No symptoms or other concerns today.     BP: normal     Sees ophthalmologist regularly.      Monitors his feet for lesions.    The patient declines recommended immunizations and referrals to recommended health screens at this time. Risks and benefits of immunizations and health screens reviewed. The patient verbalized understanding.     Review of Systems   Constitutional:  Negative for activity change, appetite change, chills, diaphoresis, fatigue, fever and unexpected weight change.   Respiratory:  Negative for apnea, cough, choking, chest tightness, shortness of breath, wheezing and stridor.    Cardiovascular:  Negative for chest pain, palpitations and leg swelling.   Gastrointestinal:  Negative for abdominal pain, diarrhea, nausea and vomiting.   Neurological:  Negative for dizziness, tremors, seizures, syncope, facial asymmetry, speech difficulty, weakness, light-headedness, numbness and headaches.   Hematological:  Negative for adenopathy. Does not bruise/bleed easily.     Objective   /77   Pulse 75   Temp 36.7 °C (98.1 °F)   Ht 1.778 m (5' 10\")   Wt 91.6 kg (202 lb)   SpO2 97%   BMI 28.98 kg/m²     Physical Exam  Constitutional:       Appearance: Normal appearance.   HENT:      Head: Normocephalic.   Eyes:      Conjunctiva/sclera: Conjunctivae normal.   Cardiovascular:      Rate and Rhythm: Normal rate.   Pulmonary:      Effort: Pulmonary effort is normal.   Skin:     General: Skin is warm.      Coloration: Skin is not jaundiced or " pale.   Neurological:      General: No focal deficit present.      Mental Status: He is alert.       Assessment/Plan     Exam findings reviewed with patient. Medications and labs reviewed. Patient will keep monitoring blood pressures and blood sugars at home; he will call the office with outliers or abnormal trends. We will call with lab results and update the plan of care Follow up in 6 months or earlier as needed.    Benefits of healthy lifestyle reviewed: low carbohydrates/fat/sugar diet; use lean white instead of red meet; use several servings of fruit and vegetables daily; use whole grain flour instead of white flour products; cook at home frequently instead of eating out; exercise 30-90 minutes 5 x/week.

## 2024-11-15 ENCOUNTER — TELEMEDICINE (OUTPATIENT)
Dept: PHARMACY | Facility: HOSPITAL | Age: 54
End: 2024-11-15
Payer: COMMERCIAL

## 2024-11-15 DIAGNOSIS — R73.09 HEMOGLOBIN A1C GREATER THAN 9%, INDICATING POOR DIABETIC CONTROL: ICD-10-CM

## 2024-11-15 NOTE — PROGRESS NOTES
Kostas T Childs, APRN-CNP,  You are receiving this task as part of the Diabetes System of Excellence Care Path developed by the [facility] to help identify and close care gaps for the patients with diabetes in your practice. This patient has been identified as having a Hba1c >8.5%. Please use the below information to schedule the patient for a diabetes follow up appointment, (or an Annual Wellness exam if they are overdue), adjust medication(s), order updated labs and/or refer the patient for a consultation with Endocrinology, Dietician, Diabetic Educator or DSME, [facility] or Pharmacy. Included for your reference is a link to the Diabetes Clinical Practice Guidelines, developed by Dr. Sona Malcolm and the  [facility]abetes Center Team.    Visit date not found  Future Appointments   Date Time Provider Department Center   5/12/2025 11:00 AM [facility] [place]     Subjective   HPI    Objective     Current BMI:   BMI Readings from Last 1 Encounters:   11/11/24 28.98 kg/m²     Wt Readings from Last 3 Encounters:   11/11/24 91.6 kg (202 lb)   07/22/24 93.8 kg (206 lb 12.8 oz)   06/24/24 97.6 kg (215 lb 3.2 oz)     There were no vitals taken for this visit.  BP Readings from Last 3 Encounters:   11/11/24 117/77   07/22/24 131/83   06/24/24 135/77     Lab Results   Component Value Date    BILITOT 0.4 04/16/2018    CALCIUM 9.1 04/16/2018    CO2 28 04/16/2018     04/16/2018    CREATININE 0.78 04/16/2018    GLUCOSE 143 (H) 04/16/2018    ALKPHOS 72 04/16/2018    K 4.4 04/16/2018    PROT 6.3 (L) 04/16/2018     04/16/2018    AST 13 04/16/2018    ALT 19 04/16/2018    BUN 21 04/16/2018    ANIONGAP 11 04/16/2018    ALBUMIN 4.4 04/16/2018     Lab Results   Component Value Date    TRIG 146 04/16/2018    CHOL 183 04/16/2018    HDL 44.0 04/16/2018     Lab Results   Component Value Date    HGBA1C 9.1 07/15/2024     The ASCVD Risk score (Katie DK, et al., 2019) failed to calculate for the following reasons:     Cannot find a previous HDL lab    Cannot find a previous total cholesterol lab    Current Outpatient Medications   Medication Instructions    Accu-Chek SmartView Test Strip strip test twice a day    atorvastatin (LIPITOR) 10 mg, oral, Every other day    glipiZIDE XL (GLUCOTROL XL) 10 mg, oral, 2 times daily, Do not crush, chew, or split.    hydroCHLOROthiazide (HYDRODIURIL) 25 mg, oral, Daily    Jardiance 25 mg, oral, Daily    ketoconazole (NIZOral) 2 % cream APPLY SPARINGLY TO AFFECTED AREA(S) TWICE DAILY    lancets (OneTouch Delica Plus Lancet) 30 gauge misc CHECK BLOOD SUGARS DAILY AND AS NEEDED    lisinopril 2.5 mg, oral, Daily    metFORMIN (GLUCOPHAGE) 1,000 mg, oral, 2 times daily    omega 3-dha-epa-fish oil (Fish OiL) 300-1,000 mg capsule,delayed release(DR/EC) 1 capsule, 2 times daily      Health Maintenance   Topic Date Due    Yearly Adult Physical  Never done    HIV Screening  Never done    MMR Vaccines (1 of 1 - Standard series) Never done    Pneumococcal Vaccine: Pediatrics (0 to 5 Years) and At-Risk Patients (6 to 64 Years) (1 of 2 - PCV) Never done    Diabetes: Retinopathy Screening  Never done    Hepatitis C Screening  Never done    Hepatitis B Vaccines (1 of 3 - 19+ 3-dose series) Never done    DTaP/Tdap/Td Vaccines (1 - Tdap) Never done    Zoster Vaccines (1 of 2) Never done    Diabetes: Urine Protein Screening  04/16/2019    Influenza Vaccine (1) Never done    COVID-19 Vaccine (1 - 2024-25 season) Never done    Lipid Panel  12/05/2024    Diabetes: Hemoglobin A1C  02/04/2025    Colorectal Cancer Screening  04/24/2033    HIB Vaccines  Aged Out    IPV Vaccines  Aged Out    Hepatitis A Vaccines  Aged Out    Meningococcal Vaccine  Aged Out    Rotavirus Vaccines  Aged Out    HPV Vaccines  Aged Out    Irritable Bowel Syndrome  Discontinued     Allergies:  Patient has no known allergies.    Current monitoring regimen:   Patient is using: glucometer    SMBG Readings: none to report    Patient Goals  -  Fasting B - 130 mg/dL  - Postprandial BG: less than 180 mg/dL  - A1c: less than 7%    PHARMACY ASSESSMENT:  Current DM Pharmacotherapy:   -metformin 1,000 mg bid  -Jardiance 25 mg daily  -glipizide XL 10 mg bid    Pertinent PMH Review:  - PMH of Pancreatitis: No  - PMH/FH of Medullary Thyroid Cancer: No  - PMH of Retinopathy: No  - PMH of Urinary Tract Infections: No    Secondary Prevention:  - Statin? Yes  - ACE-I/ARB? Yes  - Aspirin? No    Assessment/Plan   Discussed starting a new medication to help bring BG into control. Discussed an injectable GLP-1, but patient wants to try oral formulations first before doing an injectable medication. Rybelsus is covered under patient's plan for $5 per month. Will start patient on Rybelsus 3 mg daily and follow up in 3 weeks for dose titration.    Counseled patient on Rybelsus MOA, expectations, side effects, duration of therapy, administration, and monitoring parameters.   -Discussed administering greater than 30 minutes before first food, beverage, or other medications in the morning.   -Reviewed Rybelsus titration schedule, starting with 3mg once daily for 30 days, then increase to 7 mg once daily; may increase to 14 mg once daily after 30 days on the 7 mg dose if needed to achieve glycemic goals.  -If there is a missed dose, then this dose should be skipped; resume at the next scheduled dose.  -Counseled patient on the benefits of GLP-1ra, such as cardiovascular risk reduction, glycemic control, and weight loss potential.  -Advised patient that they may experience improved satiety after meals and portion sizes of meals may be reduced as doses of Rybelsus increase.  -Counseled patient to avoid foods that are fatty/oily as this may precipitate the nausea/GI upset that may occur with new start Rybelsus    ·For note: when starting Rybelsus: The lower initial dose (3 mg daily) is intended to reduce GI symptoms; it does not provide effective glycemic  control    PLAN:  START Rybelsus 3 mg daily  Follow up in 3 weeks    Kim Vasquez, Dae    Continue all meds under the continuation of care with the referring provider and clinical pharmacy team.    Link to the Diabetes Clinical Practice Guidelines developed by the  Diabetes Team:  [email or web address]/[email or web address].aspx

## 2024-12-06 ENCOUNTER — APPOINTMENT (OUTPATIENT)
Dept: PHARMACY | Facility: HOSPITAL | Age: 54
End: 2024-12-06
Payer: COMMERCIAL

## 2024-12-06 DIAGNOSIS — R73.09 HEMOGLOBIN A1C GREATER THAN 9%, INDICATING POOR DIABETIC CONTROL: ICD-10-CM

## 2024-12-06 NOTE — PROGRESS NOTES
Kostas T Childs, APRN-CNP,  You are receiving this task as part of the Diabetes System of Excellence Care Path developed by the [facility] to help identify and close care gaps for the patients with diabetes in your practice. This patient has been identified as having a Hba1c >8.5%. Please use the below information to schedule the patient for a diabetes follow up appointment, (or an Annual Wellness exam if they are overdue), adjust medication(s), order updated labs and/or refer the patient for a consultation with Endocrinology, Dietician, Diabetic Educator or DSME, [facility] or Pharmacy. Included for your reference is a link to the Diabetes Clinical Practice Guidelines, developed by Dr. Sona Malcolm and the  Diabetes Center Team.    Visit date not found  Future Appointments   Date Time Provider Department Center   5/12/2025 11:00 AM [facility] [place]     Subjective   HPI    Objective     Current BMI:   BMI Readings from Last 1 Encounters:   11/11/24 28.98 kg/m²     Wt Readings from Last 3 Encounters:   11/11/24 91.6 kg (202 lb)   07/22/24 93.8 kg (206 lb 12.8 oz)   06/24/24 97.6 kg (215 lb 3.2 oz)     There were no vitals taken for this visit.  BP Readings from Last 3 Encounters:   11/11/24 117/77   07/22/24 131/83   06/24/24 135/77     Lab Results   Component Value Date    BILITOT 0.4 04/16/2018    CALCIUM 9.1 04/16/2018    CO2 28 04/16/2018     04/16/2018    CREATININE 0.78 04/16/2018    GLUCOSE 143 (H) 04/16/2018    ALKPHOS 72 04/16/2018    K 4.4 04/16/2018    PROT 6.3 (L) 04/16/2018     04/16/2018    AST 13 04/16/2018    ALT 19 04/16/2018    BUN 21 04/16/2018    ANIONGAP 11 04/16/2018    ALBUMIN 4.4 04/16/2018     Lab Results   Component Value Date    TRIG 146 04/16/2018    CHOL 183 04/16/2018    HDL 44.0 04/16/2018     Lab Results   Component Value Date    HGBA1C 9.1 07/15/2024     The ASCVD Risk score (Katie DK, et al., 2019) failed to calculate for the following reasons:     Cannot find a previous HDL lab    Cannot find a previous total cholesterol lab    Current Outpatient Medications   Medication Instructions    Accu-Chek SmartView Test Strip strip test twice a day    atorvastatin (LIPITOR) 10 mg, oral, Every other day    glipiZIDE XL (GLUCOTROL XL) 10 mg, oral, 2 times daily, Do not crush, chew, or split.    hydroCHLOROthiazide (HYDRODIURIL) 25 mg, oral, Daily    Jardiance 25 mg, oral, Daily    ketoconazole (NIZOral) 2 % cream APPLY SPARINGLY TO AFFECTED AREA(S) TWICE DAILY    lancets (OneTouch Delica Plus Lancet) 30 gauge misc CHECK BLOOD SUGARS DAILY AND AS NEEDED    lisinopril 2.5 mg, oral, Daily    metFORMIN (GLUCOPHAGE) 1,000 mg, oral, 2 times daily    omega 3-dha-epa-fish oil (Fish OiL) 300-1,000 mg capsule,delayed release(DR/EC) 1 capsule, 2 times daily    semaglutide (RYBELSUS) 3 mg, oral, Daily      Health Maintenance   Topic Date Due    Yearly Adult Physical  Never done    HIV Screening  Never done    MMR Vaccines (1 of 1 - Standard series) Never done    Pneumococcal Vaccine: Pediatrics (0 to 5 Years) and At-Risk Patients (6 to 64 Years) (1 of 2 - PCV) Never done    Diabetes: Retinopathy Screening  Never done    Hepatitis C Screening  Never done    Hepatitis B Vaccines (1 of 3 - 19+ 3-dose series) Never done    DTaP/Tdap/Td Vaccines (1 - Tdap) Never done    Zoster Vaccines (1 of 2) Never done    Diabetes: Urine Protein Screening  04/16/2019    Influenza Vaccine (1) Never done    COVID-19 Vaccine (1 - 2024-25 season) Never done    Lipid Panel  12/05/2024    Diabetes: Hemoglobin A1C  02/04/2025    Colorectal Cancer Screening  04/24/2033    HIB Vaccines  Aged Out    IPV Vaccines  Aged Out    Hepatitis A Vaccines  Aged Out    Meningococcal Vaccine  Aged Out    Rotavirus Vaccines  Aged Out    HPV Vaccines  Aged Out    Irritable Bowel Syndrome  Discontinued     Allergies:  Patient has no known allergies.    Current monitoring regimen:   Patient is using: glucometer    SMBG  Readings: none to report    Patient Goals  - Fasting B - 130 mg/dL  - Postprandial BG: less than 180 mg/dL  - A1c: less than 7%    PHARMACY ASSESSMENT:  Current DM Pharmacotherapy:   -metformin 1,000 mg bid  -Jardiance 25 mg daily  -glipizide XL 10 mg bid    Pertinent PMH Review:  - PMH of Pancreatitis: No  - PMH/FH of Medullary Thyroid Cancer: No  - PMH of Retinopathy: No  - PMH of Urinary Tract Infections: No    Secondary Prevention:  - Statin? Yes  - ACE-I/ARB? Yes  - Aspirin? No    Assessment/Plan   Patient reports no issues with starting Rybelsus. He has only been taking medication for a week. Will follow up in 2 weeks to do dose titration.    PLAN:  CONTINUE Rybelsus 3 mg daily  Follow up in 2 weeks    Kim Vasquez, PharmD    Continue all meds under the continuation of care with the referring provider and clinical pharmacy team.    Link to the Diabetes Clinical Practice Guidelines developed by the  Diabetes Team:  [email or web address]/[email or web address].aspx

## 2024-12-20 ENCOUNTER — APPOINTMENT (OUTPATIENT)
Dept: PHARMACY | Facility: HOSPITAL | Age: 54
End: 2024-12-20
Payer: COMMERCIAL

## 2024-12-20 DIAGNOSIS — R73.09 HEMOGLOBIN A1C GREATER THAN 9%, INDICATING POOR DIABETIC CONTROL: ICD-10-CM

## 2024-12-20 NOTE — PROGRESS NOTES
Kostas T Childs, APRN-CNP,  You are receiving this task as part of the Diabetes System of Excellence Care Path developed by the [facility] to help identify and close care gaps for the patients with diabetes in your practice. This patient has been identified as having a Hba1c >8.5%. Please use the below information to schedule the patient for a diabetes follow up appointment, (or an Annual Wellness exam if they are overdue), adjust medication(s), order updated labs and/or refer the patient for a consultation with Endocrinology, Dietician, Diabetic Educator or DSME, [facility] or Pharmacy. Included for your reference is a link to the Diabetes Clinical Practice Guidelines, developed by Dr. Sona Malcolm and the  Diabetes Center Team.    Visit date not found  Future Appointments   Date Time Provider Department Center   5/12/2025 11:00 AM [facility] [place]     Subjective   HPI    Objective     Current BMI:   BMI Readings from Last 1 Encounters:   11/11/24 28.98 kg/m²     Wt Readings from Last 3 Encounters:   11/11/24 91.6 kg (202 lb)   07/22/24 93.8 kg (206 lb 12.8 oz)   06/24/24 97.6 kg (215 lb 3.2 oz)     There were no vitals taken for this visit.  BP Readings from Last 3 Encounters:   11/11/24 117/77   07/22/24 131/83   06/24/24 135/77     Lab Results   Component Value Date    BILITOT 0.4 04/16/2018    CALCIUM 9.1 04/16/2018    CO2 28 04/16/2018     04/16/2018    CREATININE 0.78 04/16/2018    GLUCOSE 143 (H) 04/16/2018    ALKPHOS 72 04/16/2018    K 4.4 04/16/2018    PROT 6.3 (L) 04/16/2018     04/16/2018    AST 13 04/16/2018    ALT 19 04/16/2018    BUN 21 04/16/2018    ANIONGAP 11 04/16/2018    ALBUMIN 4.4 04/16/2018     Lab Results   Component Value Date    TRIG 146 04/16/2018    CHOL 183 04/16/2018    HDL 44.0 04/16/2018     Lab Results   Component Value Date    HGBA1C 9.1 07/15/2024     The ASCVD Risk score (Katie DK, et al., 2019) failed to calculate for the following reasons:     Cannot find a previous HDL lab    Cannot find a previous total cholesterol lab    Current Outpatient Medications   Medication Instructions    Accu-Chek SmartView Test Strip strip test twice a day    atorvastatin (LIPITOR) 10 mg, oral, Every other day    glipiZIDE XL (GLUCOTROL XL) 10 mg, oral, 2 times daily, Do not crush, chew, or split.    hydroCHLOROthiazide (HYDRODIURIL) 25 mg, oral, Daily    Jardiance 25 mg, oral, Daily    ketoconazole (NIZOral) 2 % cream APPLY SPARINGLY TO AFFECTED AREA(S) TWICE DAILY    lancets (OneTouch Delica Plus Lancet) 30 gauge misc CHECK BLOOD SUGARS DAILY AND AS NEEDED    lisinopril 2.5 mg, oral, Daily    metFORMIN (GLUCOPHAGE) 1,000 mg, oral, 2 times daily    omega 3-dha-epa-fish oil (Fish OiL) 300-1,000 mg capsule,delayed release(DR/EC) 1 capsule, 2 times daily    semaglutide (RYBELSUS) 3 mg, oral, Daily      Health Maintenance   Topic Date Due    Yearly Adult Physical  Never done    HIV Screening  Never done    MMR Vaccines (1 of 1 - Standard series) Never done    Diabetes: Retinopathy Screening  Never done    Hepatitis C Screening  Never done    Hepatitis B Vaccines (1 of 3 - 19+ 3-dose series) Never done    Pneumococcal Vaccine (1 of 2 - PCV) Never done    DTaP/Tdap/Td Vaccines (1 - Tdap) Never done    Zoster Vaccines (1 of 2) Never done    Diabetes: Urine Protein Screening  04/16/2019    Influenza Vaccine (1) Never done    COVID-19 Vaccine (1 - 2024-25 season) Never done    Lipid Panel  12/05/2024    Diabetes: Hemoglobin A1C  02/04/2025    Colorectal Cancer Screening  04/24/2033    HIB Vaccines  Aged Out    IPV Vaccines  Aged Out    Hepatitis A Vaccines  Aged Out    Meningococcal Vaccine  Aged Out    Rotavirus Vaccines  Aged Out    HPV Vaccines  Aged Out    Irritable Bowel Syndrome  Discontinued     Allergies:  Patient has no known allergies.    Current monitoring regimen:   Patient is using: glucometer    SMBG Readings:   am 170 to 190  Night 160 to 170    Patient Goals  -  Fasting B - 130 mg/dL  - Postprandial BG: less than 180 mg/dL  - A1c: less than 7%    PHARMACY ASSESSMENT:  Current DM Pharmacotherapy:   -metformin 1,000 mg bid  -Jardiance 25 mg daily  -glipizide XL 10 mg bid    Pertinent PMH Review:  - PMH of Pancreatitis: No  - PMH/FH of Medullary Thyroid Cancer: No  - PMH of Retinopathy: No  - PMH of Urinary Tract Infections: No    Secondary Prevention:  - Statin? Yes  - ACE-I/ARB? Yes  - Aspirin? No    Assessment/Plan   Patient reports no issues with starting Rybelsus. His BG is starting to come down, but is still out of goal. He has also noticed some weight loss with medication. Will increase Rybelsus to 7 mg daily for better BG control and will follow up in 4 weeks to review BG log.    PLAN:  INCREASE Rybelsus to 7 mg daily  Follow up in 4 weeks    Kim Vasquez, PharmD    Continue all meds under the continuation of care with the referring provider and clinical pharmacy team.    Link to the Diabetes Clinical Practice Guidelines developed by the  Diabetes Team:  [email or web address]

## 2025-01-02 DIAGNOSIS — E11.9 TYPE 2 DIABETES MELLITUS WITHOUT COMPLICATIONS (MULTI): ICD-10-CM

## 2025-01-02 RX ORDER — METFORMIN HYDROCHLORIDE 1000 MG/1
1000 TABLET ORAL 2 TIMES DAILY
Qty: 180 TABLET | Refills: 0 | Status: SHIPPED | OUTPATIENT
Start: 2025-01-02

## 2025-01-17 ENCOUNTER — APPOINTMENT (OUTPATIENT)
Dept: PHARMACY | Facility: HOSPITAL | Age: 55
End: 2025-01-17
Payer: COMMERCIAL

## 2025-01-17 DIAGNOSIS — R73.09 HEMOGLOBIN A1C GREATER THAN 9%, INDICATING POOR DIABETIC CONTROL: ICD-10-CM

## 2025-01-17 NOTE — PROGRESS NOTES
Kostas T Childs, APRN-CNP,  You are receiving this task as part of the Diabetes System of Excellence Care Path developed by the [facility] to help identify and close care gaps for the patients with diabetes in your practice. This patient has been identified as having a Hba1c >8.5%. Please use the below information to schedule the patient for a diabetes follow up appointment, (or an Annual Wellness exam if they are overdue), adjust medication(s), order updated labs and/or refer the patient for a consultation with Endocrinology, Dietician, Diabetic Educator or DSME, [facility] or Pharmacy. Included for your reference is a link to the Diabetes Clinical Practice Guidelines, developed by Dr. Sona Malcolm and the  Diabetes Center Team.    Visit date not found  Future Appointments   Date Time Provider Department Center   1/17/2025  1:00 PM Kim Vasquez, PharmD [id number]   5/12/2025 11:00 AM [facility]INA [facility]uga [facility] [place]     Subjective   HPI    Objective     Current BMI:   BMI Readings from Last 1 Encounters:   11/11/24 28.98 kg/m²     Wt Readings from Last 3 Encounters:   11/11/24 91.6 kg (202 lb)   07/22/24 93.8 kg (206 lb 12.8 oz)   06/24/24 97.6 kg (215 lb 3.2 oz)     There were no vitals taken for this visit.  BP Readings from Last 3 Encounters:   11/11/24 117/77   07/22/24 131/83   06/24/24 135/77     Lab Results   Component Value Date    BILITOT 0.4 04/16/2018    CALCIUM 9.1 04/16/2018    CO2 28 04/16/2018     04/16/2018    CREATININE 0.78 04/16/2018    GLUCOSE 143 (H) 04/16/2018    ALKPHOS 72 04/16/2018    K 4.4 04/16/2018    PROT 6.3 (L) 04/16/2018     04/16/2018    AST 13 04/16/2018    ALT 19 04/16/2018    BUN 21 04/16/2018    ANIONGAP 11 04/16/2018    ALBUMIN 4.4 04/16/2018     Lab Results   Component Value Date    TRIG 146 04/16/2018    CHOL 183 04/16/2018    HDL 44.0 04/16/2018     Lab Results   Component Value Date    HGBA1C 9.1 07/15/2024     The ASCVD Risk score Miguel  BRIAN, et al., 2019) failed to calculate for the following reasons:    Cannot find a previous HDL lab    Cannot find a previous total cholesterol lab    Current Outpatient Medications   Medication Instructions    Accu-Chek SmartView Test Strip strip test twice a day    atorvastatin (LIPITOR) 10 mg, oral, Every other day    glipiZIDE XL (GLUCOTROL XL) 10 mg, oral, 2 times daily, Do not crush, chew, or split.    hydroCHLOROthiazide (HYDRODIURIL) 25 mg, oral, Daily    Jardiance 25 mg, oral, Daily    ketoconazole (NIZOral) 2 % cream APPLY SPARINGLY TO AFFECTED AREA(S) TWICE DAILY    lancets (OneTouch Delica Plus Lancet) 30 gauge misc CHECK BLOOD SUGARS DAILY AND AS NEEDED    lisinopril 2.5 mg, oral, Daily    metFORMIN (GLUCOPHAGE) 1,000 mg, oral, 2 times daily    omega 3-dha-epa-fish oil (Fish OiL) 300-1,000 mg capsule,delayed release(DR/EC) 1 capsule, 2 times daily    semaglutide (RYBELSUS) 7 mg, oral, Daily      Health Maintenance   Topic Date Due    Yearly Adult Physical  Never done    HIV Screening  Never done    MMR Vaccines (1 of 1 - Standard series) Never done    Diabetes: Retinopathy Screening  Never done    Hepatitis C Screening  Never done    Hepatitis B Vaccines (1 of 3 - 19+ 3-dose series) Never done    Pneumococcal Vaccine (1 of 2 - PCV) Never done    DTaP/Tdap/Td Vaccines (1 - Tdap) Never done    Zoster Vaccines (1 of 2) Never done    Diabetes: Urine Protein Screening  04/16/2019    Influenza Vaccine (1) Never done    COVID-19 Vaccine (1 - 2024-25 season) Never done    Lipid Panel  12/05/2024    Diabetes: Hemoglobin A1C  02/04/2025    Colorectal Cancer Screening  04/24/2033    HIB Vaccines  Aged Out    IPV Vaccines  Aged Out    Hepatitis A Vaccines  Aged Out    Meningococcal Vaccine  Aged Out    Rotavirus Vaccines  Aged Out    HPV Vaccines  Aged Out    Irritable Bowel Syndrome  Discontinued     Allergies:  Patient has no known allergies.    Current monitoring regimen:   Patient is using: glucometer    SMBG  Readings:   am 170 to 190  Night 160 to 170    Patient Goals  - Fasting B - 130 mg/dL  - Postprandial BG: less than 180 mg/dL  - A1c: less than 7%    PHARMACY ASSESSMENT:  Current DM Pharmacotherapy:   -metformin 1,000 mg bid  -Jardiance 25 mg daily  -glipizide XL 10 mg bid  -Rybelsus 7 mg daily    Pertinent PMH Review:  - PMH of Pancreatitis: No  - PMH/FH of Medullary Thyroid Cancer: No  - PMH of Retinopathy: No  - PMH of Urinary Tract Infections: No    Secondary Prevention:  - Statin? Yes  - ACE-I/ARB? Yes  - Aspirin? No    Assessment/Plan   Patient reports some nausea and constipation. His BG is about the same from last call. Patient would like to hold Rybelsus at 7 mg daily to allow his body more time to adjust to medication before doing a dose increase. Will follow up in 4 weeks to review BG log.    PLAN:  CONTINUE Rybelsus 7 mg daily  Follow up in 4 weeks    Kim Vasquez, PharmD    Continue all meds under the continuation of care with the referring provider and clinical pharmacy team.    Link to the Diabetes Clinical Practice Guidelines developed by the  Diabetes Team:  [email or web address].aspx

## 2025-02-07 DIAGNOSIS — E11.9 TYPE 2 DIABETES MELLITUS WITHOUT COMPLICATIONS (MULTI): ICD-10-CM

## 2025-02-07 RX ORDER — GLIPIZIDE 10 MG/1
10 TABLET, FILM COATED, EXTENDED RELEASE ORAL 2 TIMES DAILY
Qty: 180 TABLET | Refills: 0 | OUTPATIENT
Start: 2025-02-07 | End: 2025-05-08

## 2025-02-14 ENCOUNTER — APPOINTMENT (OUTPATIENT)
Dept: PHARMACY | Facility: HOSPITAL | Age: 55
End: 2025-02-14
Payer: COMMERCIAL

## 2025-02-14 DIAGNOSIS — R73.09 HEMOGLOBIN A1C GREATER THAN 9%, INDICATING POOR DIABETIC CONTROL: ICD-10-CM

## 2025-02-14 RX ORDER — ORAL SEMAGLUTIDE 7 MG/1
7 TABLET ORAL DAILY
Qty: 30 TABLET | Refills: 1 | OUTPATIENT
Start: 2025-02-14

## 2025-02-14 RX ORDER — TIRZEPATIDE 2.5 MG/.5ML
2.5 INJECTION, SOLUTION SUBCUTANEOUS WEEKLY
Qty: 2 ML | Refills: 0 | Status: SHIPPED | OUTPATIENT
Start: 2025-02-14

## 2025-02-14 NOTE — PROGRESS NOTES
Kostas T Childs, APRN-CNP,  You are receiving this task as part of the Diabetes System of Excellence Care Path developed by the [facility] to help identify and close care gaps for the patients with diabetes in your practice. This patient has been identified as having a Hba1c >8.5%. Please use the below information to schedule the patient for a diabetes follow up appointment, (or an Annual Wellness exam if they are overdue), adjust medication(s), order updated labs and/or refer the patient for a consultation with Endocrinology, Dietician, Diabetic Educator or DSME, [facility] or Pharmacy. Included for your reference is a link to the Diabetes Clinical Practice Guidelines, developed by Dr. Sona Malcolm and the  Diabetes Center Team.    Visit date not found  Future Appointments   Date Time Provider Department Center   5/12/2025 11:00 AM [facility]INA [facility]uga [facility] [place]     Subjective   HPI    Objective     Current BMI:   BMI Readings from Last 1 Encounters:   11/11/24 28.98 kg/m²     Wt Readings from Last 3 Encounters:   11/11/24 91.6 kg (202 lb)   07/22/24 93.8 kg (206 lb 12.8 oz)   06/24/24 97.6 kg (215 lb 3.2 oz)     There were no vitals taken for this visit.  BP Readings from Last 3 Encounters:   11/11/24 117/77   07/22/24 131/83   06/24/24 135/77     Lab Results   Component Value Date    BILITOT 0.4 04/16/2018    CALCIUM 9.1 04/16/2018    CO2 28 04/16/2018     04/16/2018    CREATININE 0.78 04/16/2018    GLUCOSE 143 (H) 04/16/2018    ALKPHOS 72 04/16/2018    K 4.4 04/16/2018    PROT 6.3 (L) 04/16/2018     04/16/2018    AST 13 04/16/2018    ALT 19 04/16/2018    BUN 21 04/16/2018    ANIONGAP 11 04/16/2018    ALBUMIN 4.4 04/16/2018     Lab Results   Component Value Date    TRIG 146 04/16/2018    CHOL 183 04/16/2018    HDL 44.0 04/16/2018     Lab Results   Component Value Date    HGBA1C 9.1 07/15/2024     The ASCVD Risk score (Katie DK, et al., 2019) failed to calculate for the following reasons:     Cannot find a previous HDL lab    Cannot find a previous total cholesterol lab    Current Outpatient Medications   Medication Instructions    Accu-Chek SmartView Test Strip strip test twice a day    atorvastatin (LIPITOR) 10 mg, oral, Every other day    glipiZIDE XL (GLUCOTROL XL) 10 mg, oral, 2 times daily, Do not crush, chew, or split.    hydroCHLOROthiazide (HYDRODIURIL) 25 mg, oral, Daily    Jardiance 25 mg, oral, Daily    ketoconazole (NIZOral) 2 % cream APPLY SPARINGLY TO AFFECTED AREA(S) TWICE DAILY    lancets (OneTouch Delica Plus Lancet) 30 gauge misc CHECK BLOOD SUGARS DAILY AND AS NEEDED    lisinopril 2.5 mg, oral, Daily    metFORMIN (GLUCOPHAGE) 1,000 mg, oral, 2 times daily    omega 3-dha-epa-fish oil (Fish OiL) 300-1,000 mg capsule,delayed release(DR/EC) 1 capsule, 2 times daily    semaglutide (RYBELSUS) 7 mg, oral, Daily      Health Maintenance   Topic Date Due    Yearly Adult Physical  Never done    HIV Screening  Never done    MMR Vaccines (1 of 1 - Standard series) Never done    Diabetes: Retinopathy Screening  Never done    Hepatitis C Screening  Never done    Hepatitis B Vaccines (1 of 3 - 19+ 3-dose series) Never done    Pneumococcal Vaccine (1 of 2 - PCV) Never done    DTaP/Tdap/Td Vaccines (1 - Tdap) Never done    Zoster Vaccines (1 of 2) Never done    Diabetes: Urine Protein Screening  04/16/2019    Influenza Vaccine (1) Never done    COVID-19 Vaccine (1 - 2024-25 season) Never done    Lipid Panel  12/05/2024    Diabetes: Hemoglobin A1C  02/04/2025    Colorectal Cancer Screening  04/24/2033    HIB Vaccines  Aged Out    IPV Vaccines  Aged Out    Hepatitis A Vaccines  Aged Out    Meningococcal Vaccine  Aged Out    Rotavirus Vaccines  Aged Out    HPV Vaccines  Aged Out    Irritable Bowel Syndrome  Discontinued     Allergies:  Patient has no known allergies.    Current monitoring regimen:   Patient is using: glucometer    SMBG Readings:   am 170 to 190  Night 160 to 170    Patient Goals  -  Fasting B - 130 mg/dL  - Postprandial BG: less than 180 mg/dL  - A1c: less than 7%    PHARMACY ASSESSMENT:  Current DM Pharmacotherapy:   -metformin 1,000 mg bid  -Jardiance 25 mg daily  -glipizide XL 10 mg bid  -Rybelsus 7 mg daily    Pertinent PMH Review:  - PMH of Pancreatitis: No  - PMH/FH of Medullary Thyroid Cancer: No  - PMH of Retinopathy: No  - PMH of Urinary Tract Infections: No    Secondary Prevention:  - Statin? Yes  - ACE-I/ARB? Yes  - Aspirin? No    Assessment/Plan   Patient reports his nausea and constipation has stayed about the same. He had even started taking Rybelsus every other day to help aleve ADR. Discussed switching from Rybelsus to Mounjaro for better tolerability. He is agreeable to switch. Will start him on Mounjaro 2.5 mg weekly and will follow up in 4 weeks to review BG log.    Mounjaro Education:    - Counseled patient on Mounjaro MOA, expectations, side effects, duration of therapy, administration, and monitoring parameters.  - Provided detailed dosing and administration counseling to ensure proper technique.   - Reviewed Mounjaro titration schedule, starting with 2.5 mg once weekly to 5 mg, 7.5 mg, 10 mg, 12.5 mg and if tolerated 15 mg.  - Counseled patient on the benefits of GiP, such as cardiovascular risk reduction, glycemic control, and weight loss potential.  - Reviewed storage requirements of Mounjaro when not in use, and when to administer the medication if a dose is missed.  - Advised patient that they may experience improved satiety after meals and portion sizes of meals may be reduced as doses of Mounjaro increase.    PLAN:  STOP Rybelsus  START Mounjaro 2.5 mg weekly  Follow up in 4 weeks    Kim Vasquez, PharmD    Continue all meds under the continuation of care with the referring provider and clinical pharmacy team.    Link to the Diabetes Clinical Practice Guidelines developed by the  Diabetes  Team:  [email or web address]

## 2025-03-03 ENCOUNTER — TELEPHONE (OUTPATIENT)
Dept: PRIMARY CARE | Facility: CLINIC | Age: 55
End: 2025-03-03
Payer: COMMERCIAL

## 2025-03-03 DIAGNOSIS — E11.9 TYPE 2 DIABETES MELLITUS WITHOUT COMPLICATIONS (MULTI): ICD-10-CM

## 2025-03-03 RX ORDER — GLIPIZIDE 10 MG/1
10 TABLET, FILM COATED, EXTENDED RELEASE ORAL 2 TIMES DAILY
Qty: 180 TABLET | Refills: 0 | Status: SHIPPED | OUTPATIENT
Start: 2025-03-03 | End: 2025-06-01

## 2025-03-07 ENCOUNTER — APPOINTMENT (OUTPATIENT)
Dept: PHARMACY | Facility: HOSPITAL | Age: 55
End: 2025-03-07
Payer: COMMERCIAL

## 2025-03-28 ENCOUNTER — APPOINTMENT (OUTPATIENT)
Dept: PHARMACY | Facility: HOSPITAL | Age: 55
End: 2025-03-28
Payer: COMMERCIAL

## 2025-03-28 DIAGNOSIS — R73.09 HEMOGLOBIN A1C GREATER THAN 9%, INDICATING POOR DIABETIC CONTROL: ICD-10-CM

## 2025-03-28 RX ORDER — TIRZEPATIDE 2.5 MG/.5ML
2.5 INJECTION, SOLUTION SUBCUTANEOUS WEEKLY
Qty: 2 ML | Refills: 0 | Status: SHIPPED | OUTPATIENT
Start: 2025-03-28

## 2025-03-28 NOTE — PROGRESS NOTES
Kostas T Childs, APRN-CNP,  You are receiving this task as part of the Diabetes System of Excellence Care Path developed by the [facility] to help identify and close care gaps for the patients with diabetes in your practice. This patient has been identified as having a Hba1c >8.5%. Please use the below information to schedule the patient for a diabetes follow up appointment, (or an Annual Wellness exam if they are overdue), adjust medication(s), order updated labs and/or refer the patient for a consultation with Endocrinology, Dietician, Diabetic Educator or DSME, [facility] or Pharmacy. Included for your reference is a link to the Diabetes Clinical Practice Guidelines, developed by Dr. Sona Malcolm and the  Diabetes Center Team.    Visit date not found  Future Appointments   Date Time Provider Department Center   5/12/2025 11:00 AM [facility]INA [facility]uga [facility] [place]     Subjective   HPI    Objective     Current BMI:   BMI Readings from Last 1 Encounters:   11/11/24 28.98 kg/m²     Wt Readings from Last 3 Encounters:   11/11/24 91.6 kg (202 lb)   07/22/24 93.8 kg (206 lb 12.8 oz)   06/24/24 97.6 kg (215 lb 3.2 oz)     There were no vitals taken for this visit.  BP Readings from Last 3 Encounters:   11/11/24 117/77   07/22/24 131/83   06/24/24 135/77     Lab Results   Component Value Date    BILITOT 0.4 04/16/2018    CALCIUM 9.1 04/16/2018    CO2 28 04/16/2018     04/16/2018    CREATININE 0.78 04/16/2018    GLUCOSE 143 (H) 04/16/2018    ALKPHOS 72 04/16/2018    K 4.4 04/16/2018    PROT 6.3 (L) 04/16/2018     04/16/2018    AST 13 04/16/2018    ALT 19 04/16/2018    BUN 21 04/16/2018    ANIONGAP 11 04/16/2018    ALBUMIN 4.4 04/16/2018     Lab Results   Component Value Date    TRIG 146 04/16/2018    CHOL 183 04/16/2018    HDL 44.0 04/16/2018     Lab Results   Component Value Date    HGBA1C 9.1 07/15/2024     The ASCVD Risk score (Katie DK, et al., 2019) failed to calculate for the following reasons:     Cannot find a previous HDL lab    Cannot find a previous total cholesterol lab    Current Outpatient Medications   Medication Instructions    Accu-Chek SmartView Test Strip strip test twice a day    atorvastatin (LIPITOR) 10 mg, oral, Every other day    glipiZIDE XL (GLUCOTROL XL) 10 mg, oral, 2 times daily, Do not crush, chew, or split.    hydroCHLOROthiazide (HYDRODIURIL) 25 mg, oral, Daily    Jardiance 25 mg, oral, Daily    ketoconazole (NIZOral) 2 % cream APPLY SPARINGLY TO AFFECTED AREA(S) TWICE DAILY    lancets (OneTouch Delica Plus Lancet) 30 gauge misc CHECK BLOOD SUGARS DAILY AND AS NEEDED    lisinopril 2.5 mg, oral, Daily    metFORMIN (GLUCOPHAGE) 1,000 mg, oral, 2 times daily    Mounjaro 2.5 mg, subcutaneous, Weekly    omega 3-dha-epa-fish oil (Fish OiL) 300-1,000 mg capsule,delayed release(DR/EC) 1 capsule, 2 times daily      Health Maintenance   Topic Date Due    Yearly Adult Physical  Never done    HIV Screening  Never done    MMR Vaccines (1 of 1 - Standard series) Never done    Diabetes: Retinopathy Screening  Never done    Hepatitis C Screening  Never done    Hepatitis B Vaccines (1 of 3 - 19+ 3-dose series) Never done    Pneumococcal Vaccine (1 of 2 - PCV) Never done    DTaP/Tdap/Td Vaccines (1 - Tdap) Never done    Zoster Vaccines (1 of 2) Never done    Diabetes: Urine Protein Screening  2019    Influenza Vaccine (1) Never done    COVID-19 Vaccine ( -  season) Never done    Lipid Panel  2024    Diabetes: Hemoglobin A1C  2025    Colorectal Cancer Screening  2033    HIB Vaccines  Aged Out    IPV Vaccines  Aged Out    Hepatitis A Vaccines  Aged Out    Meningococcal Vaccine  Aged Out    Rotavirus Vaccines  Aged Out    HPV Vaccines  Aged Out    Irritable Bowel Syndrome  Discontinued     Allergies:  Patient has no known allergies.    Current monitoring regimen:   Patient is using: glucometer    SMBG Readings:   180 to 190    Patient Goals  - Fasting B - 130 mg/dL  -  Postprandial BG: less than 180 mg/dL  - A1c: less than 7%    PHARMACY ASSESSMENT:  Current DM Pharmacotherapy:   -metformin 1,000 mg bid  -Jardiance 25 mg daily  -glipizide XL 10 mg bid  -Mounjaro 2.5 mg weekly    Pertinent PMH Review:  - PMH of Pancreatitis: No  - PMH/FH of Medullary Thyroid Cancer: No  - PMH of Retinopathy: No  - PMH of Urinary Tract Infections: No    Secondary Prevention:  - Statin? Yes  - ACE-I/ARB? Yes  - Aspirin? No    Assessment/Plan   Patient got Norovirus since our last call. Was hardly eating for 3 weeks and lost almost 20 lbs. He was able to gain some weight back, but is still feeling a little weak. Will hold him on current dose and will follow up in 4 weeks.    PLAN:  CONTINUE Mounjaro 2.5 mg weekly  Follow up in 4 weeks    Kim Vasquez, PharmD    Continue all meds under the continuation of care with the referring provider and clinical pharmacy team.    Link to the Diabetes Clinical Practice Guidelines developed by the  Diabetes Team:  [email or web address]

## 2025-04-25 ENCOUNTER — APPOINTMENT (OUTPATIENT)
Dept: PHARMACY | Facility: HOSPITAL | Age: 55
End: 2025-04-25
Payer: COMMERCIAL

## 2025-04-25 DIAGNOSIS — R73.09 HEMOGLOBIN A1C GREATER THAN 9%, INDICATING POOR DIABETIC CONTROL: ICD-10-CM

## 2025-04-25 RX ORDER — TIRZEPATIDE 2.5 MG/.5ML
2.5 INJECTION, SOLUTION SUBCUTANEOUS WEEKLY
Qty: 2 ML | Refills: 0 | Status: SHIPPED | OUTPATIENT
Start: 2025-04-25

## 2025-04-25 NOTE — PROGRESS NOTES
Kostas T Childs, APRN-CNP,  You are receiving this task as part of the Diabetes System of Excellence Care Path developed by the [facility] to help identify and close care gaps for the patients with diabetes in your practice. This patient has been identified as having a Hba1c >8.5%. Please use the below information to schedule the patient for a diabetes follow up appointment, (or an Annual Wellness exam if they are overdue), adjust medication(s), order updated labs and/or refer the patient for a consultation with Endocrinology, Dietician, Diabetic Educator or DSME, [facility] or Pharmacy. Included for your reference is a link to the Diabetes Clinical Practice Guidelines, developed by Dr. Sona Malcolm and the  Diabetes Center Team.    Visit date not found  Future Appointments   Date Time Provider Department Center   5/12/2025 11:00 AM [facility]INA [facility]uga [facility] [place]     Subjective   HPI    Objective     Current BMI:   BMI Readings from Last 1 Encounters:   11/11/24 28.98 kg/m²     Wt Readings from Last 3 Encounters:   11/11/24 91.6 kg (202 lb)   07/22/24 93.8 kg (206 lb 12.8 oz)   06/24/24 97.6 kg (215 lb 3.2 oz)     There were no vitals taken for this visit.  BP Readings from Last 3 Encounters:   11/11/24 117/77   07/22/24 131/83   06/24/24 135/77     Lab Results   Component Value Date    BILITOT 0.4 04/16/2018    CALCIUM 9.1 04/16/2018    CO2 28 04/16/2018     04/16/2018    CREATININE 0.78 04/16/2018    GLUCOSE 143 (H) 04/16/2018    ALKPHOS 72 04/16/2018    K 4.4 04/16/2018    PROT 6.3 (L) 04/16/2018     04/16/2018    AST 13 04/16/2018    ALT 19 04/16/2018    BUN 21 04/16/2018    ANIONGAP 11 04/16/2018    ALBUMIN 4.4 04/16/2018     Lab Results   Component Value Date    TRIG 146 04/16/2018    CHOL 183 04/16/2018    HDL 44.0 04/16/2018     Lab Results   Component Value Date    HGBA1C 9.1 07/15/2024     The ASCVD Risk score (Katie DK, et al., 2019) failed to calculate for the following reasons:     Cannot find a previous HDL lab    Cannot find a previous total cholesterol lab    Current Outpatient Medications   Medication Instructions    Accu-Chek SmartView Test Strip strip test twice a day    atorvastatin (LIPITOR) 10 mg, oral, Every other day    glipiZIDE XL (GLUCOTROL XL) 10 mg, oral, 2 times daily, Do not crush, chew, or split.    hydroCHLOROthiazide (HYDRODIURIL) 25 mg, oral, Daily    Jardiance 25 mg, oral, Daily    ketoconazole (NIZOral) 2 % cream APPLY SPARINGLY TO AFFECTED AREA(S) TWICE DAILY    lancets (OneTouch Delica Plus Lancet) 30 gauge misc CHECK BLOOD SUGARS DAILY AND AS NEEDED    lisinopril 2.5 mg, oral, Daily    metFORMIN (GLUCOPHAGE) 1,000 mg, oral, 2 times daily    Mounjaro 2.5 mg, subcutaneous, Weekly    omega 3-dha-epa-fish oil (Fish OiL) 300-1,000 mg capsule,delayed release(DR/EC) 1 capsule, 2 times daily      Health Maintenance   Topic Date Due    Yearly Adult Physical  Never done    HIV Screening  Never done    MMR Vaccines (1 of 1 - Standard series) Never done    Diabetes: Retinopathy Screening  Never done    Hepatitis C Screening  Never done    Hepatitis B Vaccines (1 of 3 - 19+ 3-dose series) Never done    Pneumococcal Vaccine (1 of 2 - PCV) Never done    DTaP/Tdap/Td Vaccines (1 - Tdap) Never done    Zoster Vaccines (1 of 2) Never done    Diabetes: Urine Protein Screening  2019    COVID-19 Vaccine ( season) Never done    Lipid Panel  2024    Diabetes: Hemoglobin A1C  2025    Influenza Vaccine (Season Ended) 2025    Colorectal Cancer Screening  2033    HIB Vaccines  Aged Out    IPV Vaccines  Aged Out    Hepatitis A Vaccines  Aged Out    Meningococcal Vaccine  Aged Out    Rotavirus Vaccines  Aged Out    HPV Vaccines  Aged Out    Irritable Bowel Syndrome  Discontinued     Allergies:  Patient has no known allergies.    Current monitoring regimen:   Patient is using: glucometer    SMBG Readings:   180 to 190    Patient Goals  - Fasting B -  130 mg/dL  - Postprandial BG: less than 180 mg/dL  - A1c: less than 7%    PHARMACY ASSESSMENT:  Current DM Pharmacotherapy:   -metformin 1,000 mg bid  -Jardiance 25 mg daily  -glipizide XL 10 mg bid  -Mounjaro 2.5 mg weekly    Pertinent PMH Review:  - PMH of Pancreatitis: No  - PMH/FH of Medullary Thyroid Cancer: No  - PMH of Retinopathy: No  - PMH of Urinary Tract Infections: No    Secondary Prevention:  - Statin? Yes  - ACE-I/ARB? Yes  - Aspirin? No    Assessment/Plan   Patient reports he is doing better after recovering from illness. Weight is between 189 to 191 and BG in the morning is averaging in the 190's. Will hold him at Mounjaro 2.5 mg weekly dose for another month to allow him to gain a little more weight before discussing dose increase.    PLAN:  CONTINUE Mounjaro 2.5 mg weekly  Follow up in 4 weeks    Kim Vasquez, PharmD    Continue all meds under the continuation of care with the referring provider and clinical pharmacy team.    Link to the Diabetes Clinical Practice Guidelines developed by the  Diabetes Team:  [email or web address]

## 2025-05-06 LAB
EST. AVERAGE GLUCOSE BLD GHB EST-MCNC: 203 MG/DL
EST. AVERAGE GLUCOSE BLD GHB EST-SCNC: 11.2 MMOL/L
HBA1C MFR BLD: 8.7 %

## 2025-05-12 ENCOUNTER — APPOINTMENT (OUTPATIENT)
Dept: PRIMARY CARE | Facility: CLINIC | Age: 55
End: 2025-05-12
Payer: COMMERCIAL

## 2025-05-12 VITALS
TEMPERATURE: 97.9 F | SYSTOLIC BLOOD PRESSURE: 121 MMHG | DIASTOLIC BLOOD PRESSURE: 68 MMHG | RESPIRATION RATE: 16 BRPM | HEART RATE: 86 BPM | OXYGEN SATURATION: 97 % | BODY MASS INDEX: 28.32 KG/M2 | WEIGHT: 197.4 LBS

## 2025-05-12 DIAGNOSIS — I10 ESSENTIAL (PRIMARY) HYPERTENSION: Primary | ICD-10-CM

## 2025-05-12 DIAGNOSIS — E11.9 TYPE 2 DIABETES MELLITUS WITHOUT COMPLICATION, WITHOUT LONG-TERM CURRENT USE OF INSULIN: ICD-10-CM

## 2025-05-12 DIAGNOSIS — E66.3 OVERWEIGHT (BMI 25.0-29.9): ICD-10-CM

## 2025-05-12 DIAGNOSIS — M54.2 NECK PAIN: ICD-10-CM

## 2025-05-12 DIAGNOSIS — E11.9 TYPE 2 DIABETES MELLITUS WITHOUT COMPLICATIONS: ICD-10-CM

## 2025-05-12 DIAGNOSIS — Z12.5 SCREENING FOR PROSTATE CANCER: ICD-10-CM

## 2025-05-12 PROCEDURE — 99214 OFFICE O/P EST MOD 30 MIN: CPT | Performed by: NURSE PRACTITIONER

## 2025-05-12 PROCEDURE — 3078F DIAST BP <80 MM HG: CPT | Performed by: NURSE PRACTITIONER

## 2025-05-12 PROCEDURE — 4010F ACE/ARB THERAPY RXD/TAKEN: CPT | Performed by: NURSE PRACTITIONER

## 2025-05-12 PROCEDURE — 3074F SYST BP LT 130 MM HG: CPT | Performed by: NURSE PRACTITIONER

## 2025-05-12 PROCEDURE — 1036F TOBACCO NON-USER: CPT | Performed by: NURSE PRACTITIONER

## 2025-05-12 RX ORDER — LISINOPRIL 2.5 MG/1
2.5 TABLET ORAL DAILY
Qty: 90 TABLET | Refills: 1 | Status: SHIPPED | OUTPATIENT
Start: 2025-05-12

## 2025-05-12 RX ORDER — LANCETS
EACH MISCELLANEOUS
Qty: 100 EACH | Refills: 3 | Status: SHIPPED | OUTPATIENT
Start: 2025-05-12

## 2025-05-12 RX ORDER — HYDROCHLOROTHIAZIDE 25 MG/1
25 TABLET ORAL DAILY
Qty: 90 TABLET | Refills: 1 | Status: SHIPPED | OUTPATIENT
Start: 2025-05-12

## 2025-05-12 RX ORDER — ATORVASTATIN CALCIUM 10 MG/1
10 TABLET, FILM COATED ORAL EVERY OTHER DAY
Qty: 45 TABLET | Refills: 1 | Status: SHIPPED | OUTPATIENT
Start: 2025-05-12

## 2025-05-12 RX ORDER — BLOOD SUGAR DIAGNOSTIC
STRIP MISCELLANEOUS
Status: CANCELLED | OUTPATIENT
Start: 2025-05-12

## 2025-05-12 ASSESSMENT — PATIENT HEALTH QUESTIONNAIRE - PHQ9
1. LITTLE INTEREST OR PLEASURE IN DOING THINGS: NOT AT ALL
2. FEELING DOWN, DEPRESSED OR HOPELESS: NOT AT ALL
SUM OF ALL RESPONSES TO PHQ9 QUESTIONS 1 AND 2: 0

## 2025-05-12 NOTE — PROGRESS NOTES
Subjective   Patient ID: Ke Haney is a 54 y.o. male who presents for Diabetes and Hypertension.    HPI  Patient in office for follow-up on hypertension, diabetes, elevated BMI, and to discuss recent lab work. A1C: 8.7%; improved but remains above goal. He sees Dr. Vasquez for diabetes medication management. AM fasting levels at home have been 110s-120s (per patient report). The patient has lost > 15 lbs recently due to a viral illness; he no longer has symptoms and is doing well overall. The patient complains of intermittent left lower cervical neck pain, mostly after sleeping, none currently, x 2 weeks. No  symptoms or other concerns today.     BP: normal     Sees ophthalmologist regularly.      Monitors his feet for lesions.    The patient declines recommended immunizations and referrals to recommended health screens (except PSA recheck) at this time. Risks and benefits of immunizations and health screens reviewed. The patient verbalized understanding.     Review of Systems   Constitutional:  Negative for activity change, appetite change, chills, diaphoresis, fatigue, fever and unexpected weight change.   Musculoskeletal: Intermittent left lower posterior neck pain.  Respiratory:  Negative for apnea, cough, choking, chest tightness, shortness of breath, wheezing and stridor.    Cardiovascular:  Negative for chest pain, palpitations and leg swelling.   Gastrointestinal:  Negative for abdominal pain, diarrhea, nausea and vomiting.   Neurological:  Negative for dizziness, tremors, seizures, syncope, facial asymmetry, speech difficulty, weakness, light-headedness, numbness and headaches.   Hematological:  Negative for adenopathy. Does not bruise/bleed easily.     Objective   /68   Pulse 86   Temp 36.6 °C (97.9 °F) (Temporal)   Resp 16   Wt 89.5 kg (197 lb 6.4 oz)   SpO2 97%   BMI 28.32 kg/m²     Physical Exam  Constitutional:       Appearance: Normal appearance.   HENT:      Head: Normocephalic.   Eyes:       Conjunctiva/sclera: Conjunctivae normal.   Cardiovascular:      Rate and Rhythm: Normal rate.   Pulmonary:      Effort: Pulmonary effort is normal.   Musculoskeletal:         General: No swelling, tenderness or deformity. Normal range of motion.   Skin:     General: Skin is warm.      Coloration: Skin is not jaundiced or pale.   Neurological:      General: No focal deficit present.      Mental Status: He is alert.       Assessment/Plan     Exam findings reviewed with patient. Medications and labs reviewed. Patient will keep monitoring blood pressures and blood sugars at home; he will call the office with outliers or abnormal trends. May use OTC Lidocaine patches for neck pain as needed. Advocated cold therapy. We will call with lab results and update the plan of care Follow up in 6 months or earlier as needed.    Benefits of healthy lifestyle reviewed: low carbohydrates/fat/sugar diet; use lean white instead of red meet; use several servings of fruit and vegetables daily; use whole grain flour instead of white flour products; cook at home frequently instead of eating out; exercise 30-90 minutes 5 x/week.

## 2025-05-21 ENCOUNTER — TELEPHONE (OUTPATIENT)
Dept: PRIMARY CARE | Facility: CLINIC | Age: 55
End: 2025-05-21
Payer: COMMERCIAL

## 2025-05-21 DIAGNOSIS — I10 ESSENTIAL (PRIMARY) HYPERTENSION: ICD-10-CM

## 2025-05-21 DIAGNOSIS — E11.9 TYPE 2 DIABETES MELLITUS WITHOUT COMPLICATIONS: ICD-10-CM

## 2025-05-21 RX ORDER — HYDROCHLOROTHIAZIDE 25 MG/1
25 TABLET ORAL DAILY
Qty: 90 TABLET | Refills: 1 | Status: SHIPPED | OUTPATIENT
Start: 2025-05-21

## 2025-05-21 RX ORDER — ATORVASTATIN CALCIUM 10 MG/1
10 TABLET, FILM COATED ORAL EVERY OTHER DAY
Qty: 45 TABLET | Refills: 1 | Status: SHIPPED | OUTPATIENT
Start: 2025-05-21

## 2025-05-21 RX ORDER — ATORVASTATIN CALCIUM 10 MG/1
10 TABLET, FILM COATED ORAL EVERY OTHER DAY
Qty: 45 TABLET | Refills: 1 | Status: CANCELLED | OUTPATIENT
Start: 2025-05-21

## 2025-05-21 RX ORDER — HYDROCHLOROTHIAZIDE 25 MG/1
25 TABLET ORAL DAILY
Qty: 90 TABLET | Refills: 1 | Status: CANCELLED | OUTPATIENT
Start: 2025-05-21

## 2025-05-21 RX ORDER — LISINOPRIL 2.5 MG/1
2.5 TABLET ORAL DAILY
Qty: 90 TABLET | Refills: 1 | Status: CANCELLED | OUTPATIENT
Start: 2025-05-21

## 2025-05-21 RX ORDER — LISINOPRIL 2.5 MG/1
2.5 TABLET ORAL DAILY
Qty: 90 TABLET | Refills: 1 | Status: SHIPPED | OUTPATIENT
Start: 2025-05-21

## 2025-05-21 NOTE — TELEPHONE ENCOUNTER
Ke stopped in and asked for the following the be sent to his local pharmacy [facility]    Atorvastatin 10 mg 1 tablet daily  Hydrochlorothiazide 25 mg 1 tablet daily  Lisinopril 2.5 mg 1 tablet EOD    It went to Mail Order, but we are not sure how.

## 2025-05-23 ENCOUNTER — APPOINTMENT (OUTPATIENT)
Dept: PHARMACY | Facility: HOSPITAL | Age: 55
End: 2025-05-23
Payer: COMMERCIAL

## 2025-05-23 DIAGNOSIS — R73.09 HEMOGLOBIN A1C GREATER THAN 9%, INDICATING POOR DIABETIC CONTROL: ICD-10-CM

## 2025-05-23 RX ORDER — TIRZEPATIDE 2.5 MG/.5ML
2.5 INJECTION, SOLUTION SUBCUTANEOUS WEEKLY
Qty: 2 ML | Refills: 0 | Status: SHIPPED | OUTPATIENT
Start: 2025-05-23

## 2025-05-23 NOTE — PROGRESS NOTES
Kostas T Childs, APRN-CNP,  You are receiving this task as part of the Diabetes System of Excellence Care Path developed by the [facility] to help identify and close care gaps for the patients with diabetes in your practice. This patient has been identified as having a Hba1c >8.5%. Please use the below information to schedule the patient for a diabetes follow up appointment, (or an Annual Wellness exam if they are overdue), adjust medication(s), order updated labs and/or refer the patient for a consultation with Endocrinology, Dietician, Diabetic Educator or DSME, [facility] or Pharmacy. Included for your reference is a link to the Diabetes Clinical Practice Guidelines, developed by Dr. Sona Malcolm and the  Diabetes Center Team.    Visit date not found  Future Appointments   Date Time Provider Department Center   8/25/2025 10:30 AM [facility] [place]     Subjective   HPI    Objective     Current BMI:   BMI Readings from Last 1 Encounters:   05/12/25 28.32 kg/m²     Wt Readings from Last 3 Encounters:   05/12/25 89.5 kg (197 lb 6.4 oz)   11/11/24 91.6 kg (202 lb)   07/22/24 93.8 kg (206 lb 12.8 oz)     There were no vitals taken for this visit.  BP Readings from Last 3 Encounters:   05/12/25 121/68   11/11/24 117/77   07/22/24 131/83     Lab Results   Component Value Date    BILITOT 0.4 04/16/2018    CALCIUM 9.1 04/16/2018    CO2 28 04/16/2018     04/16/2018    CREATININE 0.78 04/16/2018    GLUCOSE 143 (H) 04/16/2018    ALKPHOS 72 04/16/2018    K 4.4 04/16/2018    PROT 6.3 (L) 04/16/2018     04/16/2018    AST 13 04/16/2018    ALT 19 04/16/2018    BUN 21 04/16/2018    ANIONGAP 11 04/16/2018    ALBUMIN 4.4 04/16/2018     Lab Results   Component Value Date    TRIG 146 04/16/2018    CHOL 183 04/16/2018    HDL 44.0 04/16/2018     Lab Results   Component Value Date    HGBA1C 8.7 (H) 05/05/2025     The ASCVD Risk score (Katie DK, et al., 2019) failed to calculate for the following  reasons:    Cannot find a previous HDL lab    Cannot find a previous total cholesterol lab    Current Outpatient Medications   Medication Instructions    Accu-Chek SmartView Test Strip strip test twice a day    atorvastatin (LIPITOR) 10 mg, oral, Every other day    glipiZIDE XL (GLUCOTROL XL) 10 mg, oral, 2 times daily, Do not crush, chew, or split.    hydroCHLOROthiazide (HYDRODIURIL) 25 mg, oral, Daily    Jardiance 25 mg, oral, Daily    ketoconazole (NIZOral) 2 % cream APPLY SPARINGLY TO AFFECTED AREA(S) TWICE DAILY    lancets (Accu-Chek Softclix Lancets) misc Check blood sugar once daily before breakfast and as needed.    lisinopril 2.5 mg, oral, Daily    metFORMIN (GLUCOPHAGE) 1,000 mg, oral, 2 times daily    Mounjaro 2.5 mg, subcutaneous, Weekly    omega 3-dha-epa-fish oil (Fish OiL) 300-1,000 mg capsule,delayed release(DR/EC) 1 capsule, 2 times daily      Health Maintenance   Topic Date Due    Yearly Adult Physical  Never done    HIV Screening  Never done    MMR Vaccines (1 of 1 - Standard series) Never done    Diabetes: Retinopathy Screening  Never done    Hepatitis C Screening  Never done    Hepatitis B Vaccines (1 of 3 - 19+ 3-dose series) Never done    Pneumococcal Vaccine (1 of 2 - PCV) Never done    DTaP/Tdap/Td Vaccines (1 - Tdap) Never done    Zoster Vaccines (1 of 2) Never done    Diabetes: Urine Protein Screening  04/16/2019    COVID-19 Vaccine (1 - 2024-25 season) Never done    Lipid Panel  12/05/2024    Diabetes: Hemoglobin A1C  08/05/2025    Influenza Vaccine (Season Ended) 09/01/2025    Colorectal Cancer Screening  04/24/2033    HIB Vaccines  Aged Out    IPV Vaccines  Aged Out    Hepatitis A Vaccines  Aged Out    Meningococcal Vaccine  Aged Out    Rotavirus Vaccines  Aged Out    HPV Vaccines  Aged Out    Irritable Bowel Syndrome  Discontinued     Allergies:  Losartan    Current monitoring regimen:   Patient is using: glucometer    SMBG Readings:   171 158 107 146 119 130 136 130 96 118 137 106  123    Patient Goals  - Fasting B - 130 mg/dL  - Postprandial BG: less than 180 mg/dL  - A1c: less than 7%    PHARMACY ASSESSMENT:  Current DM Pharmacotherapy:   -metformin 1,000 mg bid  -Jardiance 25 mg daily  -glipizide XL 10 mg bid  -Mounjaro 2.5 mg weekly    Pertinent PMH Review:  - PMH of Pancreatitis: No  - PMH/FH of Medullary Thyroid Cancer: No  - PMH of Retinopathy: No  - PMH of Urinary Tract Infections: No    Secondary Prevention:  - Statin? Yes  - ACE-I/ARB? Yes  - Aspirin? No    Assessment/Plan   Patient reports he is doing good with Mounjaro. Fasting BG is mostly in goal at this time. Will hold Mounjaro at current dose and will follow up in 5 weeks.    PLAN:  CONTINUE Mounjaro 2.5 mg weekly  Follow up in 5 weeks    Kim Vasquez, PharmD    Continue all meds under the continuation of care with the referring provider and clinical pharmacy team.    Link to the Diabetes Clinical Practice Guidelines developed by the  Diabetes Team:  [email or web address]/Pages/default.aspx

## 2025-06-01 DIAGNOSIS — E11.9 TYPE 2 DIABETES MELLITUS WITHOUT COMPLICATIONS: ICD-10-CM

## 2025-06-02 RX ORDER — GLIPIZIDE 10 MG/1
10 TABLET, FILM COATED, EXTENDED RELEASE ORAL 2 TIMES DAILY
Qty: 180 TABLET | Refills: 0 | Status: SHIPPED | OUTPATIENT
Start: 2025-06-02 | End: 2025-08-31

## 2025-06-26 DIAGNOSIS — R73.09 HEMOGLOBIN A1C GREATER THAN 9%, INDICATING POOR DIABETIC CONTROL: Primary | ICD-10-CM

## 2025-06-27 ENCOUNTER — APPOINTMENT (OUTPATIENT)
Dept: PHARMACY | Facility: HOSPITAL | Age: 55
End: 2025-06-27
Payer: COMMERCIAL

## 2025-06-27 DIAGNOSIS — R73.09 HEMOGLOBIN A1C GREATER THAN 9%, INDICATING POOR DIABETIC CONTROL: ICD-10-CM

## 2025-06-27 RX ORDER — BLOOD-GLUCOSE METER
EACH MISCELLANEOUS
Qty: 1 EACH | Refills: 0 | Status: SHIPPED | OUTPATIENT
Start: 2025-06-27

## 2025-06-30 RX ORDER — TIRZEPATIDE 5 MG/.5ML
5 INJECTION, SOLUTION SUBCUTANEOUS WEEKLY
Qty: 2 ML | Refills: 0 | Status: SHIPPED | OUTPATIENT
Start: 2025-06-30

## 2025-06-30 NOTE — PROGRESS NOTES
Kostas T Childs, APRN-CNP,  You are receiving this task as part of the Diabetes System of Excellence Care Path developed by the [facility] to help identify and close care gaps for the patients with diabetes in your practice. This patient has been identified as having a Hba1c >8.5%. Please use the below information to schedule the patient for a diabetes follow up appointment, (or an Annual Wellness exam if they are overdue), adjust medication(s), order updated labs and/or refer the patient for a consultation with Endocrinology, Dietician, Diabetic Educator or DSME, [facility] or Pharmacy. Included for your reference is a link to the Diabetes Clinical Practice Guidelines, developed by Dr. Sona Malcolm and the  Diabetes Center Team.    Visit date not found  Future Appointments   Date Time Provider Department Center   8/25/2025 10:30 AM [facility]INA [facility]uga [facility] [place]     Subjective   HPI    Objective     Current BMI:   BMI Readings from Last 1 Encounters:   05/12/25 28.32 kg/m²     Wt Readings from Last 3 Encounters:   05/12/25 89.5 kg (197 lb 6.4 oz)   11/11/24 91.6 kg (202 lb)   07/22/24 93.8 kg (206 lb 12.8 oz)     There were no vitals taken for this visit.  BP Readings from Last 3 Encounters:   05/12/25 121/68   11/11/24 117/77   07/22/24 131/83     Lab Results   Component Value Date    BILITOT 0.4 04/16/2018    CALCIUM 9.1 04/16/2018    CO2 28 04/16/2018     04/16/2018    CREATININE 0.78 04/16/2018    GLUCOSE 143 (H) 04/16/2018    ALKPHOS 72 04/16/2018    K 4.4 04/16/2018    PROT 6.3 (L) 04/16/2018     04/16/2018    AST 13 04/16/2018    ALT 19 04/16/2018    BUN 21 04/16/2018    ANIONGAP 11 04/16/2018    ALBUMIN 4.4 04/16/2018     Lab Results   Component Value Date    TRIG 146 04/16/2018    CHOL 183 04/16/2018    HDL 44.0 04/16/2018     Lab Results   Component Value Date    HGBA1C 8.7 (H) 05/05/2025     The ASCVD Risk score (Katie DK, et al., 2019) failed to calculate for the following  reasons:    Cannot find a previous HDL lab    Cannot find a previous total cholesterol lab    Current Outpatient Medications   Medication Instructions    Accu-Chek SmartView Test Strip strip test twice a day    atorvastatin (LIPITOR) 10 mg, oral, Every other day    blood-glucose meter (Accu-Chek Guide Me Glucose Mtr) misc USE TO CHECK SUGAR DAILY BEFORE BREAKFAST AND AS NEEDED    glipiZIDE XL (GLUCOTROL XL) 10 mg, oral, 2 times daily, Do not crush, chew, or split.    hydroCHLOROthiazide (HYDRODIURIL) 25 mg, oral, Daily    Jardiance 25 mg, oral, Daily    ketoconazole (NIZOral) 2 % cream APPLY SPARINGLY TO AFFECTED AREA(S) TWICE DAILY    lancets (Accu-Chek Softclix Lancets) misc Check blood sugar once daily before breakfast and as needed.    lisinopril 2.5 mg, oral, Daily    metFORMIN (GLUCOPHAGE) 1,000 mg, oral, 2 times daily    Mounjaro 2.5 mg, subcutaneous, Weekly    omega 3-dha-epa-fish oil (Fish OiL) 300-1,000 mg capsule,delayed release(DR/EC) 1 capsule, 2 times daily      Health Maintenance   Topic Date Due    Yearly Adult Physical  Never done    HIV Screening  Never done    MMR Vaccines (1 of 1 - Standard series) Never done    Diabetes: Retinopathy Screening  Never done    Hepatitis C Screening  Never done    Hepatitis B Vaccines (1 of 3 - 19+ 3-dose series) Never done    Pneumococcal Vaccine (1 of 2 - PCV) Never done    DTaP/Tdap/Td Vaccines (1 - Tdap) Never done    Zoster Vaccines (1 of 2) Never done    Diabetes: Urine Protein Screening  04/16/2019    COVID-19 Vaccine (1 - 2024-25 season) Never done    Lipid Panel  12/05/2024    Diabetes: Hemoglobin A1C  08/05/2025    Influenza Vaccine (Season Ended) 09/01/2025    Colorectal Cancer Screening  04/24/2033    HPV Vaccines (No Doses Required) Completed    HIB Vaccines  Aged Out    IPV Vaccines  Aged Out    Hepatitis A Vaccines  Aged Out    Meningococcal Vaccine  Aged Out    Rotavirus Vaccines  Aged Out    Irritable Bowel Syndrome  Discontinued      Allergies:  Losartan    Current monitoring regimen:   Patient is using: glucometer    SMBG Readings:   139 153 147 148 166 136 151 115 138 119 138 129 183 109 105 135 155 168 154 135    Patient Goals  - Fasting B - 130 mg/dL  - Postprandial BG: less than 180 mg/dL  - A1c: less than 7%    PHARMACY ASSESSMENT:  Current DM Pharmacotherapy:   -metformin 1,000 mg bid  -Jardiance 25 mg daily  -glipizide XL 10 mg bid  -Mounjaro 2.5 mg weekly    Pertinent PMH Review:  - PMH of Pancreatitis: No  - PMH/FH of Medullary Thyroid Cancer: No  - PMH of Retinopathy: No  - PMH of Urinary Tract Infections: No    Secondary Prevention:  - Statin? Yes  - ACE-I/ARB? Yes  - Aspirin? No    Assessment/Plan   Patient reports he is doing good with Mounjaro. Fasting BG is mostly in goal at this time. Will try increasing Mounjaro to 5 mg dose to help with BG readings. Will follow up in 4 weeks.    PLAN:  INCREASE Mounjaro to 5 mg weekly  Follow up in 4 weeks    Kim Vasquez, PharmD    Continue all meds under the continuation of care with the referring provider and clinical pharmacy team.    Link to the Diabetes Clinical Practice Guidelines developed by the  Diabetes Team:  [email or web address]es/[email or web address].aspx

## 2025-07-25 ENCOUNTER — APPOINTMENT (OUTPATIENT)
Dept: PHARMACY | Facility: HOSPITAL | Age: 55
End: 2025-07-25
Payer: COMMERCIAL

## 2025-07-25 DIAGNOSIS — R73.09 HEMOGLOBIN A1C GREATER THAN 9%, INDICATING POOR DIABETIC CONTROL: Primary | ICD-10-CM

## 2025-07-29 RX ORDER — TIRZEPATIDE 2.5 MG/.5ML
2.5 INJECTION, SOLUTION SUBCUTANEOUS WEEKLY
Qty: 2 ML | Refills: 0 | Status: SHIPPED | OUTPATIENT
Start: 2025-07-29

## 2025-07-29 NOTE — PROGRESS NOTES
Kostas T Childs, APRN-CNP,  You are receiving this task as part of the Diabetes System of Excellence Care Path developed by the [facility] to help identify and close care gaps for the patients with diabetes in your practice. This patient has been identified as having a Hba1c >8.5%. Please use the below information to schedule the patient for a diabetes follow up appointment, (or an Annual Wellness exam if they are overdue), adjust medication(s), order updated labs and/or refer the patient for a consultation with Endocrinology, Dietician, Diabetic Educator or DSME, [facility] or Pharmacy. Included for your reference is a link to the Diabetes Clinical Practice Guidelines, developed by Dr. Sona Malcolm and the  Diabetes Center Team.    Visit date not found  Future Appointments   Date Time Provider Department Center   8/25/2025 10:30 AM [facility]uga [facility] [place]     Subjective   HPI    Objective     Current BMI:   BMI Readings from Last 1 Encounters:   05/12/25 28.32 kg/m²     Wt Readings from Last 3 Encounters:   05/12/25 89.5 kg (197 lb 6.4 oz)   11/11/24 91.6 kg (202 lb)   07/22/24 93.8 kg (206 lb 12.8 oz)     There were no vitals taken for this visit.  BP Readings from Last 3 Encounters:   05/12/25 121/68   11/11/24 117/77   07/22/24 131/83     Lab Results   Component Value Date    BILITOT 0.4 04/16/2018    CALCIUM 9.1 04/16/2018    CO2 28 04/16/2018     04/16/2018    CREATININE 0.78 04/16/2018    GLUCOSE 143 (H) 04/16/2018    ALKPHOS 72 04/16/2018    K 4.4 04/16/2018    PROT 6.3 (L) 04/16/2018     04/16/2018    AST 13 04/16/2018    ALT 19 04/16/2018    BUN 21 04/16/2018    ANIONGAP 11 04/16/2018    ALBUMIN 4.4 04/16/2018     Lab Results   Component Value Date    TRIG 146 04/16/2018    CHOL 183 04/16/2018    HDL 44.0 04/16/2018     Lab Results   Component Value Date    HGBA1C 8.7 (H) 05/05/2025     The ASCVD Risk score (Katie DK, et al., 2019) failed to calculate for the following  reasons:    Cannot find a previous HDL lab    Cannot find a previous total cholesterol lab    Current Outpatient Medications   Medication Instructions    Accu-Chek SmartView Test Strip strip test twice a day    atorvastatin (LIPITOR) 10 mg, oral, Every other day    blood-glucose meter (Accu-Chek Guide Me Glucose Mtr) misc USE TO CHECK SUGAR DAILY BEFORE BREAKFAST AND AS NEEDED    glipiZIDE XL (GLUCOTROL XL) 10 mg, oral, 2 times daily, Do not crush, chew, or split.    hydroCHLOROthiazide (HYDRODIURIL) 25 mg, oral, Daily    Jardiance 25 mg, oral, Daily    ketoconazole (NIZOral) 2 % cream APPLY SPARINGLY TO AFFECTED AREA(S) TWICE DAILY    lancets (Accu-Chek Softclix Lancets) misc Check blood sugar once daily before breakfast and as needed.    lisinopril 2.5 mg, oral, Daily    metFORMIN (GLUCOPHAGE) 1,000 mg, oral, 2 times daily    Mounjaro 5 mg, subcutaneous, Weekly    omega 3-dha-epa-fish oil (Fish OiL) 300-1,000 mg capsule,delayed release(DR/EC) 1 capsule, 2 times daily      Health Maintenance   Topic Date Due    Yearly Adult Physical  Never done    HIV Screening  Never done    MMR Vaccines (1 of 1 - Standard series) Never done    Diabetes: Retinopathy Screening  Never done    Hepatitis C Screening  Never done    Hepatitis B Vaccines (1 of 3 - 19+ 3-dose series) Never done    Pneumococcal Vaccine (1 of 2 - PCV) Never done    DTaP/Tdap/Td Vaccines (1 - Tdap) Never done    Zoster Vaccines (1 of 2) Never done    Diabetes: Urine Protein Screening  04/16/2019    COVID-19 Vaccine (1 - 2024-25 season) Never done    Lipid Panel  12/05/2024    Diabetes: Hemoglobin A1C  08/05/2025    Influenza Vaccine (1) 09/01/2025    Colorectal Cancer Screening  04/24/2033    HIB Vaccines  Aged Out    IPV Vaccines  Aged Out    Hepatitis A Vaccines  Aged Out    Meningococcal Vaccine  Aged Out    Rotavirus Vaccines  Aged Out    HPV Vaccines  Aged Out    Irritable Bowel Syndrome  Discontinued     Allergies:  Losartan    Current monitoring  regimen:   Patient is using: glucometer    SMBG Readings:   152 146 118 130 145 168 186 121 146 156 141 109 121 182     Patient Goals  - Fasting B - 130 mg/dL  - Postprandial BG: less than 180 mg/dL  - A1c: less than 7%    PHARMACY ASSESSMENT:  Current DM Pharmacotherapy:   -metformin 1,000 mg bid  -Jardiance 25 mg daily  -glipizide XL 10 mg bid  -Mounjaro 2.5 mg weekly    Pertinent PMH Review:  - PMH of Pancreatitis: No  - PMH/FH of Medullary Thyroid Cancer: No  - PMH of Retinopathy: No  - PMH of Urinary Tract Infections: No    Secondary Prevention:  - Statin? Yes  - ACE-I/ARB? Yes  - Aspirin? No    Assessment/Plan   Patient reports he is doing good with Mounjaro. He did not increase Mounjaro after last visit. His BG is mostly in goal. Will continue him at current dose and will follow up in 3 weeks.    PLAN:  CONTINUE Mounjaro 2.5 mg weekly  Follow up in 3 weeks    Kim Vasquez, PharmD    Continue all meds under the continuation of care with the referring provider and clinical pharmacy team.    Link to the Diabetes Clinical Practice Guidelines developed by the  Diabetes Team:  [email or web address]

## 2025-08-01 DIAGNOSIS — E11.9 TYPE 2 DIABETES MELLITUS WITHOUT COMPLICATION, WITHOUT LONG-TERM CURRENT USE OF INSULIN: ICD-10-CM

## 2025-08-01 RX ORDER — EMPAGLIFLOZIN 25 MG/1
25 TABLET, FILM COATED ORAL DAILY
Qty: 90 TABLET | Refills: 0 | Status: SHIPPED | OUTPATIENT
Start: 2025-08-01

## 2025-08-12 DIAGNOSIS — E66.3 OVERWEIGHT (BMI 25.0-29.9): ICD-10-CM

## 2025-08-12 DIAGNOSIS — Z12.5 SCREENING FOR PROSTATE CANCER: ICD-10-CM

## 2025-08-12 DIAGNOSIS — I10 ESSENTIAL (PRIMARY) HYPERTENSION: ICD-10-CM

## 2025-08-12 DIAGNOSIS — E11.9 TYPE 2 DIABETES MELLITUS WITHOUT COMPLICATION, WITHOUT LONG-TERM CURRENT USE OF INSULIN: ICD-10-CM

## 2025-08-14 ENCOUNTER — APPOINTMENT (OUTPATIENT)
Dept: PHARMACY | Facility: HOSPITAL | Age: 55
End: 2025-08-14
Payer: COMMERCIAL

## 2025-08-14 DIAGNOSIS — R73.09 HEMOGLOBIN A1C GREATER THAN 9%, INDICATING POOR DIABETIC CONTROL: ICD-10-CM

## 2025-08-14 RX ORDER — TIRZEPATIDE 2.5 MG/.5ML
2.5 INJECTION, SOLUTION SUBCUTANEOUS WEEKLY
Qty: 2 ML | Refills: 0 | Status: SHIPPED | OUTPATIENT
Start: 2025-08-14

## 2025-08-19 LAB
ALBUMIN SERPL-MCNC: 4.4 G/DL (ref 3.6–5.1)
ALP SERPL-CCNC: 64 U/L (ref 35–144)
ALT SERPL-CCNC: 11 U/L (ref 9–46)
ANION GAP SERPL CALCULATED.4IONS-SCNC: 8 MMOL/L (CALC) (ref 7–17)
AST SERPL-CCNC: 13 U/L (ref 10–35)
BASOPHILS # BLD AUTO: 53 CELLS/UL (ref 0–200)
BASOPHILS NFR BLD AUTO: 0.9 %
BILIRUB SERPL-MCNC: 0.5 MG/DL (ref 0.2–1.2)
BUN SERPL-MCNC: 13 MG/DL (ref 7–25)
CALCIUM SERPL-MCNC: 8.8 MG/DL (ref 8.6–10.3)
CHLORIDE SERPL-SCNC: 104 MMOL/L (ref 98–110)
CHOLEST SERPL-MCNC: 179 MG/DL
CHOLEST/HDLC SERPL: 3.6 (CALC)
CO2 SERPL-SCNC: 29 MMOL/L (ref 20–32)
CREAT SERPL-MCNC: 0.84 MG/DL (ref 0.7–1.3)
EGFRCR SERPLBLD CKD-EPI 2021: 102 ML/MIN/1.73M2
EOSINOPHIL # BLD AUTO: 183 CELLS/UL (ref 15–500)
EOSINOPHIL NFR BLD AUTO: 3.1 %
ERYTHROCYTE [DISTWIDTH] IN BLOOD BY AUTOMATED COUNT: 12.5 % (ref 11–15)
EST. AVERAGE GLUCOSE BLD GHB EST-MCNC: 148 MG/DL
EST. AVERAGE GLUCOSE BLD GHB EST-SCNC: 8.2 MMOL/L
GLUCOSE SERPL-MCNC: 176 MG/DL (ref 65–99)
HBA1C MFR BLD: 6.8 %
HCT VFR BLD AUTO: 46.9 % (ref 38.5–50)
HDLC SERPL-MCNC: 50 MG/DL
HGB BLD-MCNC: 15.7 G/DL (ref 13.2–17.1)
LDLC SERPL CALC-MCNC: 114 MG/DL (CALC)
LYMPHOCYTES # BLD AUTO: 1941 CELLS/UL (ref 850–3900)
LYMPHOCYTES NFR BLD AUTO: 32.9 %
MCH RBC QN AUTO: 31.5 PG (ref 27–33)
MCHC RBC AUTO-ENTMCNC: 33.5 G/DL (ref 32–36)
MCV RBC AUTO: 94.2 FL (ref 80–100)
MONOCYTES # BLD AUTO: 472 CELLS/UL (ref 200–950)
MONOCYTES NFR BLD AUTO: 8 %
NEUTROPHILS # BLD AUTO: 3251 CELLS/UL (ref 1500–7800)
NEUTROPHILS NFR BLD AUTO: 55.1 %
NONHDLC SERPL-MCNC: 129 MG/DL (CALC)
PLATELET # BLD AUTO: 211 THOUSAND/UL (ref 140–400)
PMV BLD REES-ECKER: 9.2 FL (ref 7.5–12.5)
POTASSIUM SERPL-SCNC: 3.7 MMOL/L (ref 3.5–5.3)
PROT SERPL-MCNC: 6.4 G/DL (ref 6.1–8.1)
PSA SERPL-MCNC: 0.54 NG/ML
RBC # BLD AUTO: 4.98 MILLION/UL (ref 4.2–5.8)
SODIUM SERPL-SCNC: 141 MMOL/L (ref 135–146)
TRIGL SERPL-MCNC: 67 MG/DL
WBC # BLD AUTO: 5.9 THOUSAND/UL (ref 3.8–10.8)

## 2025-08-25 ENCOUNTER — APPOINTMENT (OUTPATIENT)
Dept: PRIMARY CARE | Facility: CLINIC | Age: 55
End: 2025-08-25
Payer: COMMERCIAL

## 2025-08-25 VITALS
BODY MASS INDEX: 28.38 KG/M2 | WEIGHT: 197.8 LBS | HEART RATE: 78 BPM | TEMPERATURE: 97.9 F | OXYGEN SATURATION: 96 % | SYSTOLIC BLOOD PRESSURE: 116 MMHG | DIASTOLIC BLOOD PRESSURE: 70 MMHG

## 2025-08-25 DIAGNOSIS — I10 ESSENTIAL (PRIMARY) HYPERTENSION: Primary | ICD-10-CM

## 2025-08-25 DIAGNOSIS — E66.3 OVERWEIGHT (BMI 25.0-29.9): ICD-10-CM

## 2025-08-25 DIAGNOSIS — E11.9 TYPE 2 DIABETES MELLITUS WITHOUT COMPLICATION, WITHOUT LONG-TERM CURRENT USE OF INSULIN: ICD-10-CM

## 2025-08-25 DIAGNOSIS — E78.5 HYPERLIPIDEMIA, UNSPECIFIED HYPERLIPIDEMIA TYPE: ICD-10-CM

## 2025-08-25 PROCEDURE — 4010F ACE/ARB THERAPY RXD/TAKEN: CPT | Performed by: NURSE PRACTITIONER

## 2025-08-25 PROCEDURE — 3078F DIAST BP <80 MM HG: CPT | Performed by: NURSE PRACTITIONER

## 2025-08-25 PROCEDURE — 3074F SYST BP LT 130 MM HG: CPT | Performed by: NURSE PRACTITIONER

## 2025-08-25 PROCEDURE — 99214 OFFICE O/P EST MOD 30 MIN: CPT | Performed by: NURSE PRACTITIONER

## 2025-08-25 RX ORDER — BLOOD SUGAR DIAGNOSTIC
STRIP MISCELLANEOUS
Qty: 100 STRIP | Refills: 3 | Status: SHIPPED | OUTPATIENT
Start: 2025-08-25

## 2025-08-25 ASSESSMENT — PAIN SCALES - GENERAL: PAINLEVEL_OUTOF10: 2

## 2025-08-25 ASSESSMENT — ENCOUNTER SYMPTOMS: DEPRESSION: 0

## 2025-09-01 DIAGNOSIS — E11.9 TYPE 2 DIABETES MELLITUS WITHOUT COMPLICATIONS: ICD-10-CM

## 2025-09-03 RX ORDER — GLIPIZIDE 10 MG/1
10 TABLET, FILM COATED, EXTENDED RELEASE ORAL 2 TIMES DAILY
Qty: 180 TABLET | Refills: 0 | Status: SHIPPED | OUTPATIENT
Start: 2025-09-03 | End: 2025-12-02

## 2025-09-12 ENCOUNTER — APPOINTMENT (OUTPATIENT)
Dept: PHARMACY | Facility: HOSPITAL | Age: 55
End: 2025-09-12
Payer: COMMERCIAL

## 2026-02-23 ENCOUNTER — APPOINTMENT (OUTPATIENT)
Dept: PRIMARY CARE | Facility: CLINIC | Age: 56
End: 2026-02-23
Payer: COMMERCIAL